# Patient Record
Sex: MALE | Race: WHITE | NOT HISPANIC OR LATINO | Employment: FULL TIME | URBAN - METROPOLITAN AREA
[De-identification: names, ages, dates, MRNs, and addresses within clinical notes are randomized per-mention and may not be internally consistent; named-entity substitution may affect disease eponyms.]

---

## 2017-01-09 ENCOUNTER — OFFICE VISIT (OUTPATIENT)
Dept: FAMILY MEDICINE | Facility: CLINIC | Age: 52
End: 2017-01-09
Payer: COMMERCIAL

## 2017-01-09 VITALS
HEIGHT: 66 IN | SYSTOLIC BLOOD PRESSURE: 150 MMHG | DIASTOLIC BLOOD PRESSURE: 104 MMHG | WEIGHT: 189.63 LBS | HEART RATE: 84 BPM | BODY MASS INDEX: 30.47 KG/M2

## 2017-01-09 DIAGNOSIS — F98.8 ADD (ATTENTION DEFICIT DISORDER): ICD-10-CM

## 2017-01-09 DIAGNOSIS — E34.9 TESTOSTERONE DEFICIENCY: Primary | ICD-10-CM

## 2017-01-09 PROCEDURE — 99213 OFFICE O/P EST LOW 20 MIN: CPT | Mod: S$GLB,,, | Performed by: NURSE PRACTITIONER

## 2017-01-09 PROCEDURE — 99999 PR PBB SHADOW E&M-EST. PATIENT-LVL III: CPT | Mod: PBBFAC,,, | Performed by: NURSE PRACTITIONER

## 2017-01-09 PROCEDURE — 1159F MED LIST DOCD IN RCRD: CPT | Mod: S$GLB,,, | Performed by: NURSE PRACTITIONER

## 2017-01-09 RX ORDER — LISDEXAMFETAMINE DIMESYLATE 50 MG/1
50 CAPSULE ORAL DAILY
Qty: 30 CAPSULE | Refills: 0 | Status: SHIPPED | OUTPATIENT
Start: 2017-01-16 | End: 2017-04-21 | Stop reason: SINTOL

## 2017-01-09 RX ORDER — LISDEXAMFETAMINE DIMESYLATE 50 MG/1
50 CAPSULE ORAL EVERY MORNING
Qty: 30 CAPSULE | Refills: 0 | Status: SHIPPED | OUTPATIENT
Start: 2017-02-16 | End: 2017-04-21 | Stop reason: SINTOL

## 2017-01-09 RX ORDER — TESTOSTERONE 40.5 MG/2.5G
40.5 GEL TOPICAL DAILY
Qty: 2.5 G | Refills: 3 | Status: SHIPPED | OUTPATIENT
Start: 2017-01-09 | End: 2017-04-21

## 2017-01-09 RX ORDER — LISDEXAMFETAMINE DIMESYLATE 50 MG/1
50 CAPSULE ORAL EVERY MORNING
Qty: 30 CAPSULE | Refills: 0 | Status: SHIPPED | OUTPATIENT
Start: 2017-03-16 | End: 2017-04-21 | Stop reason: SINTOL

## 2017-01-09 NOTE — PROGRESS NOTES
Subjective:       Patient ID: Lucien Linares is a 51 y.o. male.    Chief Complaint: Follow-up (3 month vyvanse)    HPI Comments: Patient is here today to review treatment for ADD using stimulant medication. The medication is without adverse side effects. However, he is noticing that some of his inattentive symptoms are slowly returning. (ie day dreaming). Wanting to discuss an increased dose.    Review of Systems   Constitutional: Negative for activity change, appetite change, fatigue and fever.   HENT: Negative.    Respiratory: Negative for cough, chest tightness, shortness of breath and wheezing.    Cardiovascular: Negative for chest pain, palpitations and leg swelling.   Gastrointestinal: Negative.    Genitourinary: Negative.    Musculoskeletal: Negative.    Neurological: Negative for dizziness, weakness, light-headedness and numbness.       Objective:      Physical Exam   Constitutional: He is oriented to person, place, and time. He appears well-nourished.   Cardiovascular: Normal rate, regular rhythm, normal heart sounds and intact distal pulses.    Pulmonary/Chest: Effort normal and breath sounds normal. He has no wheezes. He has no rales.   Neurological: He is alert and oriented to person, place, and time.   Skin: Skin is warm and dry. No rash noted.   Vitals reviewed.      Assessment:       1. Testosterone deficiency    2. ADD (attention deficit disorder)        Plan:       Lucien was seen today for follow-up.    Diagnoses and all orders for this visit:    Testosterone deficiency  -     TESTOSTERONE PANEL; Future       testosterone (ANDROGEL) 1.62 % (40.5 mg/2.5 gram) GlPk; Place 40.5 mg onto the skin once daily.    ADD (attention deficit disorder)  vyvanse refilled per PCP    Return in about 3 months (around 4/9/2017).

## 2017-01-09 NOTE — MR AVS SNAPSHOT
[facility]  [address]  [facility] [address]  Phone: [phone]  Fax: [phone]                  Lucien Linares   2017 3:20 PM   Office Visit    Description:  Male : 1965   Provider:  Emeli Dick NP   Department:  [facility]           Reason for Visit     Follow-up           Diagnoses this Visit        Comments    Testosterone deficiency    -  Primary     ADD (attention deficit disorder)                To Do List           Future Appointments        Provider Department Dept Phone    4/3/2017 10:30 AM LAB, [place] [facility] [phone]    4/10/2017 7:45 AM Jonas Walker MD [facility] [phone]      Goals (5 Years of Data)     None      Follow-Up and Disposition     Return in about 3 months (around 2017).       These Medications        Disp Refills Start End    testosterone (ANDROGEL) 1.62 % (40.5 mg/2.5 gram) GlPk 2.5 g 3 2017     Place 40.5 mg onto the skin once daily. - Transdermal    Pharmacy: [facility] [address] [place] [address] AT [address] Ph #: [phone]         [facility]     [facility] -  Assistance  Registered nurses in the [facility] provide clinical advisement, health education, appointment booking, and other advisory services.  Call for this free service at [phone].             Medications           Message regarding Medications     Verify the changes and/or additions to your medication regime listed below are the same as discussed with your clinician today.  If any of these changes or additions are incorrect, please notify your healthcare provider.        START taking these NEW medications        Refills    testosterone (ANDROGEL) 1.62 % (40.5 mg/2.5 gram) GlPk 3    Sig: Place 40.5 mg onto the skin once daily.    Class: Print    Route: Transdermal      STOP taking these  "medications     testosterone (ANDROGEL) 20.25 mg/1.25 gram (1.62 %) GlPm Place 20.25 mg onto the skin once daily.           Verify that the below list of medications is an accurate representation of the medications you are currently taking.  If none reported, the list may be blank. If incorrect, please contact your healthcare provider. Carry this list with you in case of emergency.           Current Medications     loratadine (CLARITIN) 10 mg tablet Take 10 mg by mouth daily as needed for Allergies.    meloxicam (MOBIC) 15 MG tablet TAKE 1 TABLET(15 MG) BY MOUTH EVERY DAY    lisdexamfetamine (VYVANSE) 50 MG capsule Starting on Jan 16, 2017. Take 1 capsule (50 mg total) by mouth once daily.    lisdexamfetamine (VYVANSE) 50 MG capsule Starting on Feb 16, 2017. Take 1 capsule (50 mg total) by mouth every morning.    lisdexamfetamine (VYVANSE) 50 MG capsule Starting on Mar 16, 2017. Take 1 capsule (50 mg total) by mouth every morning.    testosterone (ANDROGEL) 1.62 % (40.5 mg/2.5 gram) GlPk Place 40.5 mg onto the skin once daily.           Clinical Reference Information           Vital Signs - Last Recorded  Most recent update: 1/9/2017  4:33 PM by Saira Edmondson LPN    BP Pulse Ht Wt BMI    (!) 150/104 (BP Location: Left arm, Patient Position: Sitting, BP Method: Manual) 84 5' 6" (1.676 m) 86 kg (189 lb 9.5 oz) 30.6 kg/m2      Blood Pressure          Most Recent Value    BP  (!)  150/104      Allergies as of 1/9/2017     Codeine    Penicillins      Immunizations Administered on Date of Encounter - 1/9/2017     None      Orders Placed During Today's Visit     Future Labs/Procedures Expected by Expires    TESTOSTERONE PANEL  1/9/2017 3/10/2018      "

## 2017-02-12 DIAGNOSIS — M19.012 ARTHRITIS OF LEFT ACROMIOCLAVICULAR JOINT: ICD-10-CM

## 2017-02-12 DIAGNOSIS — M75.22 BICEPS TENDONITIS, LEFT: ICD-10-CM

## 2017-02-12 DIAGNOSIS — M25.512 ACUTE PAIN OF LEFT SHOULDER: ICD-10-CM

## 2017-02-12 RX ORDER — MELOXICAM 15 MG/1
TABLET ORAL
Qty: 60 TABLET | Refills: 0 | Status: SHIPPED | OUTPATIENT
Start: 2017-02-12 | End: 2017-04-12 | Stop reason: SDUPTHER

## 2017-03-14 ENCOUNTER — PATIENT MESSAGE (OUTPATIENT)
Dept: FAMILY MEDICINE | Facility: CLINIC | Age: 52
End: 2017-03-14

## 2017-03-14 DIAGNOSIS — E29.1 MALE HYPOGONADISM: Primary | ICD-10-CM

## 2017-03-31 PROBLEM — E34.9 TESTOSTERONE DEFICIENCY: Status: RESOLVED | Noted: 2017-01-09 | Resolved: 2017-03-31

## 2017-04-06 ENCOUNTER — LAB VISIT (OUTPATIENT)
Dept: LAB | Facility: HOSPITAL | Age: 52
End: 2017-04-06
Attending: INTERNAL MEDICINE
Payer: COMMERCIAL

## 2017-04-06 DIAGNOSIS — E29.1 OTHER TESTICULAR HYPOFUNCTION: Primary | ICD-10-CM

## 2017-04-06 LAB
ALBUMIN SERPL BCP-MCNC: 4.1 G/DL
ALP SERPL-CCNC: 110 U/L
ALT SERPL W/O P-5'-P-CCNC: 33 U/L
AST SERPL-CCNC: 33 U/L
BASOPHILS # BLD AUTO: 0.03 K/UL
BASOPHILS NFR BLD: 0.4 %
BILIRUB DIRECT SERPL-MCNC: 0.2 MG/DL
BILIRUB SERPL-MCNC: 0.6 MG/DL
COMPLEXED PSA SERPL-MCNC: 2.4 NG/ML
DIFFERENTIAL METHOD: NORMAL
EOSINOPHIL # BLD AUTO: 0.2 K/UL
EOSINOPHIL NFR BLD: 2.5 %
ERYTHROCYTE [DISTWIDTH] IN BLOOD BY AUTOMATED COUNT: 12.7 %
HCT VFR BLD AUTO: 45.7 %
HGB BLD-MCNC: 15.7 G/DL
LYMPHOCYTES # BLD AUTO: 2.1 K/UL
LYMPHOCYTES NFR BLD: 29.9 %
MCH RBC QN AUTO: 29.8 PG
MCHC RBC AUTO-ENTMCNC: 34.4 %
MCV RBC AUTO: 87 FL
MONOCYTES # BLD AUTO: 0.5 K/UL
MONOCYTES NFR BLD: 7.6 %
NEUTROPHILS # BLD AUTO: 4.1 K/UL
NEUTROPHILS NFR BLD: 59.3 %
PLATELET # BLD AUTO: 289 K/UL
PMV BLD AUTO: 10.3 FL
PROT SERPL-MCNC: 7.7 G/DL
RBC # BLD AUTO: 5.27 M/UL
TESTOST SERPL-MCNC: 247 NG/DL
WBC # BLD AUTO: 6.93 K/UL

## 2017-04-06 PROCEDURE — 84403 ASSAY OF TOTAL TESTOSTERONE: CPT

## 2017-04-06 PROCEDURE — 36415 COLL VENOUS BLD VENIPUNCTURE: CPT | Mod: PO

## 2017-04-06 PROCEDURE — 80076 HEPATIC FUNCTION PANEL: CPT

## 2017-04-06 PROCEDURE — 84153 ASSAY OF PSA TOTAL: CPT

## 2017-04-06 PROCEDURE — 85025 COMPLETE CBC W/AUTO DIFF WBC: CPT

## 2017-04-12 DIAGNOSIS — M25.512 ACUTE PAIN OF LEFT SHOULDER: ICD-10-CM

## 2017-04-12 DIAGNOSIS — M75.22 BICEPS TENDONITIS, LEFT: ICD-10-CM

## 2017-04-12 DIAGNOSIS — M19.012 ARTHRITIS OF LEFT ACROMIOCLAVICULAR JOINT: ICD-10-CM

## 2017-04-12 RX ORDER — MELOXICAM 15 MG/1
TABLET ORAL
Qty: 60 TABLET | Refills: 0 | Status: SHIPPED | OUTPATIENT
Start: 2017-04-12 | End: 2017-06-15 | Stop reason: SDUPTHER

## 2017-06-15 DIAGNOSIS — M25.512 ACUTE PAIN OF LEFT SHOULDER: ICD-10-CM

## 2017-06-15 DIAGNOSIS — M19.012 ARTHRITIS OF LEFT ACROMIOCLAVICULAR JOINT: ICD-10-CM

## 2017-06-15 DIAGNOSIS — M75.22 BICEPS TENDONITIS, LEFT: ICD-10-CM

## 2017-06-15 RX ORDER — MELOXICAM 15 MG/1
TABLET ORAL
Qty: 60 TABLET | Refills: 0 | Status: SHIPPED | OUTPATIENT
Start: 2017-06-15 | End: 2017-07-13 | Stop reason: SDUPTHER

## 2017-08-19 DIAGNOSIS — M25.512 ACUTE PAIN OF LEFT SHOULDER: ICD-10-CM

## 2017-08-19 DIAGNOSIS — M19.012 ARTHRITIS OF LEFT ACROMIOCLAVICULAR JOINT: ICD-10-CM

## 2017-08-19 DIAGNOSIS — M75.22 BICEPS TENDONITIS, LEFT: ICD-10-CM

## 2017-08-19 RX ORDER — MELOXICAM 15 MG/1
TABLET ORAL
Qty: 60 TABLET | Refills: 0 | Status: SHIPPED | OUTPATIENT
Start: 2017-08-19 | End: 2018-10-24 | Stop reason: SDUPTHER

## 2018-08-31 ENCOUNTER — TELEPHONE (OUTPATIENT)
Dept: SURGERY | Facility: CLINIC | Age: 53
End: 2018-08-31

## 2018-08-31 NOTE — TELEPHONE ENCOUNTER
----- Message from Krystyna Nguyen sent at 8/31/2018 10:52 AM CDT -----  Contact: Self  Patient is calling to see if he can get a sooner appointment than 9/6 with a surgeon to schedule gallbladder removal.  He is in a good deal of discomfort and some pain.  Was in the ED at [facility] on 8/30.  Please call patient at [phone].

## 2018-08-31 NOTE — TELEPHONE ENCOUNTER
I called patient to offer him an appointment on Tuesday, 9/4/18 at 9:30am in [place] with Dr. Cadena.  Sb

## 2018-09-04 ENCOUNTER — OFFICE VISIT (OUTPATIENT)
Dept: SURGERY | Facility: CLINIC | Age: 53
End: 2018-09-04
Payer: COMMERCIAL

## 2018-09-04 DIAGNOSIS — K80.50 BILIARY COLIC: Primary | ICD-10-CM

## 2018-09-04 PROCEDURE — 99244 OFF/OP CNSLTJ NEW/EST MOD 40: CPT | Mod: S$GLB,,, | Performed by: SURGERY

## 2018-09-04 PROCEDURE — 99999 PR PBB SHADOW E&M-EST. PATIENT-LVL III: CPT | Mod: PBBFAC,,, | Performed by: SURGERY

## 2018-09-04 NOTE — PATIENT INSTRUCTIONS
Surgery is scheduled for 09/12/18 arrival time per the preop nurse.  Preop will call from [phone]  Fasting after midnight  Someone to drive you home      THE PREOP NURSE WILL CALL, SOMETIMES AS LATE AS 4 PM IN THE AFTERNOON THE DAY BEFORE SURGERY.    Bathe the night before and the morning of your procedure with a Chlorhexidine wash such as Hibiclens, can be purchased at most Pharmacy's.    Special Instruction: no

## 2018-09-04 NOTE — LETTER
September 4, 2018      Devendra Vieyra MD  [address]  [place] [address]           [facility]  [address]  [facility] [address]  Phone: [phone]          Patient: Lucien Linares   MR Number: [id number]   YOB: 1965   Date of Visit: 9/4/2018       Dear Dr. Devendra Vieyra:    Thank you for referring Lucien Linares to me for evaluation. Attached you will find relevant portions of my assessment and plan of care.    If you have questions, please do not hesitate to call me. I look forward to following Lucien iLnares along with you.    Sincerely,    Yuval Cadena MD    Enclosure  CC:  No Recipients    If you would like to receive this communication electronically, please contact [email or web address] or [phone] to request more information on [employer] Link access.    For providers and/or their staff who would like to refer a patient to [facility], please contact us through our one-stop-shop provider referral line, [facility], at [phone].    If you feel you have received this communication in error or would no longer like to receive these types of communications, please e-mail [email or web address]

## 2018-09-04 NOTE — PROGRESS NOTES
Subjective:       Patient ID: Lucien Linares is a 52 y.o. male.    Chief Complaint: Consult (Gallstones)    HPI  Pleasant 51 yo M referred to me in consultation from Dr Vieyra.  Pt presented to urgent care last week with pain in the epigastric region that radiated around his r flank.  Pt notes that pain came on suddenly and lasted for several hours prompting his visit to urgent care.  He denies n/v.  No fever/chills.  He notes that he had one similar attack of pain years ago.  He has felt fie since attack last week.  US in urgent care demonstrated cholelithiasis. Pt is otherwise healthy.  Pt with no other significant PShx.   Review of Systems   Constitutional: Negative for activity change, appetite change, diaphoresis, fever and unexpected weight change.   HENT: Negative for congestion and hearing loss.    Respiratory: Negative for cough, chest tightness and wheezing.    Cardiovascular: Negative for chest pain and palpitations.   Gastrointestinal: Positive for abdominal pain. Negative for abdominal distention, anal bleeding, blood in stool, constipation, diarrhea, nausea, rectal pain and vomiting.   Genitourinary: Negative for difficulty urinating, dysuria and hematuria.   Musculoskeletal: Negative for back pain and gait problem.   Skin: Negative for color change and wound.   Neurological: Negative for tremors and seizures.   Hematological: Negative for adenopathy. Does not bruise/bleed easily.   Psychiatric/Behavioral: Negative for agitation and decreased concentration.       Objective:      Physical Exam   Constitutional: He is oriented to person, place, and time. He appears well-developed and well-nourished.   HENT:   Head: Normocephalic and atraumatic.   Eyes: Pupils are equal, round, and reactive to light. Right eye exhibits no discharge. Left eye exhibits no discharge. No scleral icterus.   Neck: Normal range of motion. No tracheal deviation present. No thyromegaly present.   Cardiovascular: Normal rate,  regular rhythm and normal heart sounds. Exam reveals no gallop and no friction rub.   No murmur heard.  Pulmonary/Chest: Effort normal and breath sounds normal. He has no wheezes. He exhibits no tenderness.   Abdominal: Soft. He exhibits no distension and no mass. There is no tenderness. There is no rebound and no guarding. No hernia.   Musculoskeletal: Normal range of motion. He exhibits no edema or deformity.   Lymphadenopathy:     He has no cervical adenopathy.        Right: No supraclavicular adenopathy present.        Left: No supraclavicular adenopathy present.   Neurological: He is alert and oriented to person, place, and time. Coordination normal.   Skin: Skin is warm. No rash noted. No erythema. No pallor.   Psychiatric: He has a normal mood and affect.   Vitals reviewed.        Lab Results   Component Value Date    WBC 6.94 04/28/2017    HGB 15.1 04/28/2017    HCT 45.9 04/28/2017    MCV 88 04/28/2017     04/28/2017     CBC and CMP at urgent care reviewed and WNL    US; cholelithiasis    Assessment:     Biliary colic  No diagnosis found.    Plan:       D/w pt. I have recommended and offered lap faustina as I suspect he has been suffering with biliary colic.  Procedure scheduled for next Wednesday.

## 2018-09-04 NOTE — Clinical Note
I saw your patient, Lucien Linares in the office.  Attached are my findings and plan.  Thank you for referring him to my office and if you have any questions please do not hesitate to call my cell [phone].Wyatt Cadena

## 2018-09-04 NOTE — H&P (VIEW-ONLY)
Subjective:       Patient ID: Lucien Linares is a 52 y.o. male.    Chief Complaint: Consult (Gallstones)    HPI  Pleasant 53 yo M referred to me in consultation from Dr Vieyra.  Pt presented to urgent care last week with pain in the epigastric region that radiated around his r flank.  Pt notes that pain came on suddenly and lasted for several hours prompting his visit to urgent care.  He denies n/v.  No fever/chills.  He notes that he had one similar attack of pain years ago.  He has felt fie since attack last week.  US in urgent care demonstrated cholelithiasis. Pt is otherwise healthy.  Pt with no other significant PShx.   Review of Systems   Constitutional: Negative for activity change, appetite change, diaphoresis, fever and unexpected weight change.   HENT: Negative for congestion and hearing loss.    Respiratory: Negative for cough, chest tightness and wheezing.    Cardiovascular: Negative for chest pain and palpitations.   Gastrointestinal: Positive for abdominal pain. Negative for abdominal distention, anal bleeding, blood in stool, constipation, diarrhea, nausea, rectal pain and vomiting.   Genitourinary: Negative for difficulty urinating, dysuria and hematuria.   Musculoskeletal: Negative for back pain and gait problem.   Skin: Negative for color change and wound.   Neurological: Negative for tremors and seizures.   Hematological: Negative for adenopathy. Does not bruise/bleed easily.   Psychiatric/Behavioral: Negative for agitation and decreased concentration.       Objective:      Physical Exam   Constitutional: He is oriented to person, place, and time. He appears well-developed and well-nourished.   HENT:   Head: Normocephalic and atraumatic.   Eyes: Pupils are equal, round, and reactive to light. Right eye exhibits no discharge. Left eye exhibits no discharge. No scleral icterus.   Neck: Normal range of motion. No tracheal deviation present. No thyromegaly present.   Cardiovascular: Normal rate,  regular rhythm and normal heart sounds. Exam reveals no gallop and no friction rub.   No murmur heard.  Pulmonary/Chest: Effort normal and breath sounds normal. He has no wheezes. He exhibits no tenderness.   Abdominal: Soft. He exhibits no distension and no mass. There is no tenderness. There is no rebound and no guarding. No hernia.   Musculoskeletal: Normal range of motion. He exhibits no edema or deformity.   Lymphadenopathy:     He has no cervical adenopathy.        Right: No supraclavicular adenopathy present.        Left: No supraclavicular adenopathy present.   Neurological: He is alert and oriented to person, place, and time. Coordination normal.   Skin: Skin is warm. No rash noted. No erythema. No pallor.   Psychiatric: He has a normal mood and affect.   Vitals reviewed.        Lab Results   Component Value Date    WBC 6.94 04/28/2017    HGB 15.1 04/28/2017    HCT 45.9 04/28/2017    MCV 88 04/28/2017     04/28/2017     CBC and CMP at urgent care reviewed and WNL    US; cholelithiasis    Assessment:     Biliary colic  No diagnosis found.    Plan:       D/w pt. I have recommended and offered lap faustina as I suspect he has been suffering with biliary colic.  Procedure scheduled for next Wednesday.

## 2018-09-05 RX ORDER — SODIUM CHLORIDE 9 MG/ML
INJECTION, SOLUTION INTRAVENOUS CONTINUOUS
Status: CANCELLED | OUTPATIENT
Start: 2018-09-05

## 2018-09-05 RX ORDER — LIDOCAINE HYDROCHLORIDE 10 MG/ML
1 INJECTION, SOLUTION EPIDURAL; INFILTRATION; INTRACAUDAL; PERINEURAL ONCE
Status: CANCELLED | OUTPATIENT
Start: 2018-09-05 | End: 2018-09-05

## 2018-09-05 RX ORDER — METRONIDAZOLE 500 MG/100ML
500 INJECTION, SOLUTION INTRAVENOUS
Status: CANCELLED | OUTPATIENT
Start: 2018-09-05

## 2018-09-06 ENCOUNTER — PATIENT MESSAGE (OUTPATIENT)
Dept: ADMINISTRATIVE | Facility: OTHER | Age: 53
End: 2018-09-06

## 2018-09-11 ENCOUNTER — ANESTHESIA EVENT (OUTPATIENT)
Dept: SURGERY | Facility: HOSPITAL | Age: 53
End: 2018-09-11
Payer: COMMERCIAL

## 2018-09-12 ENCOUNTER — ANESTHESIA (OUTPATIENT)
Dept: SURGERY | Facility: HOSPITAL | Age: 53
End: 2018-09-12
Payer: COMMERCIAL

## 2018-09-12 ENCOUNTER — HOSPITAL ENCOUNTER (OUTPATIENT)
Facility: HOSPITAL | Age: 53
Discharge: HOME | End: 2018-09-12
Attending: SURGERY | Admitting: SURGERY
Payer: COMMERCIAL

## 2018-09-12 VITALS
SYSTOLIC BLOOD PRESSURE: 134 MMHG | DIASTOLIC BLOOD PRESSURE: 79 MMHG | HEIGHT: 66 IN | WEIGHT: 190 LBS | BODY MASS INDEX: 30.53 KG/M2 | TEMPERATURE: 98 F | OXYGEN SATURATION: 93 % | RESPIRATION RATE: 14 BRPM | HEART RATE: 102 BPM

## 2018-09-12 DIAGNOSIS — K80.50 BILIARY COLIC: ICD-10-CM

## 2018-09-12 PROCEDURE — 27201423 OPTIME MED/SURG SUP & DEVICES STERILE SUPPLY: Mod: PO | Performed by: SURGERY

## 2018-09-12 PROCEDURE — 25000003 PHARM REV CODE 250: Mod: PO | Performed by: NURSE ANESTHETIST, CERTIFIED REGISTERED

## 2018-09-12 PROCEDURE — D9220A PRA ANESTHESIA: Mod: CRNA,,, | Performed by: NURSE ANESTHETIST, CERTIFIED REGISTERED

## 2018-09-12 PROCEDURE — 47562 LAPAROSCOPIC CHOLECYSTECTOMY: CPT | Mod: ,,, | Performed by: SURGERY

## 2018-09-12 PROCEDURE — 71000033 HC RECOVERY, INTIAL HOUR: Mod: PO | Performed by: SURGERY

## 2018-09-12 PROCEDURE — 71000039 HC RECOVERY, EACH ADD'L HOUR: Mod: PO | Performed by: SURGERY

## 2018-09-12 PROCEDURE — 63600175 PHARM REV CODE 636 W HCPCS: Mod: PO | Performed by: NURSE ANESTHETIST, CERTIFIED REGISTERED

## 2018-09-12 PROCEDURE — 88304 TISSUE EXAM BY PATHOLOGIST: CPT | Performed by: PATHOLOGY

## 2018-09-12 PROCEDURE — 88304 TISSUE EXAM BY PATHOLOGIST: CPT | Mod: 26,,, | Performed by: PATHOLOGY

## 2018-09-12 PROCEDURE — 25000003 PHARM REV CODE 250: Mod: PO | Performed by: SURGERY

## 2018-09-12 PROCEDURE — 63600175 PHARM REV CODE 636 W HCPCS: Mod: PO | Performed by: SURGERY

## 2018-09-12 PROCEDURE — S0030 INJECTION, METRONIDAZOLE: HCPCS | Mod: PO | Performed by: SURGERY

## 2018-09-12 PROCEDURE — 25000003 PHARM REV CODE 250: Mod: PO | Performed by: ANESTHESIOLOGY

## 2018-09-12 PROCEDURE — 36000709 HC OR TIME LEV III EA ADD 15 MIN: Mod: PO | Performed by: SURGERY

## 2018-09-12 PROCEDURE — 37000008 HC ANESTHESIA 1ST 15 MINUTES: Mod: PO | Performed by: SURGERY

## 2018-09-12 PROCEDURE — 36000708 HC OR TIME LEV III 1ST 15 MIN: Mod: PO | Performed by: SURGERY

## 2018-09-12 PROCEDURE — D9220A PRA ANESTHESIA: Mod: ANES,,, | Performed by: ANESTHESIOLOGY

## 2018-09-12 PROCEDURE — 37000009 HC ANESTHESIA EA ADD 15 MINS: Mod: PO | Performed by: SURGERY

## 2018-09-12 PROCEDURE — 63600175 PHARM REV CODE 636 W HCPCS: Mod: PO | Performed by: ANESTHESIOLOGY

## 2018-09-12 RX ORDER — LIDOCAINE HYDROCHLORIDE 10 MG/ML
1 INJECTION, SOLUTION EPIDURAL; INFILTRATION; INTRACAUDAL; PERINEURAL ONCE
Status: DISCONTINUED | OUTPATIENT
Start: 2018-09-12 | End: 2018-09-12 | Stop reason: HOSPADM

## 2018-09-12 RX ORDER — HYDROCODONE BITARTRATE AND ACETAMINOPHEN 5; 325 MG/1; MG/1
1 TABLET ORAL EVERY 6 HOURS PRN
Qty: 30 TABLET | Refills: 0 | Status: SHIPPED | OUTPATIENT
Start: 2018-09-12 | End: 2018-10-23

## 2018-09-12 RX ORDER — PROPOFOL 10 MG/ML
VIAL (ML) INTRAVENOUS
Status: DISCONTINUED | OUTPATIENT
Start: 2018-09-12 | End: 2018-09-12

## 2018-09-12 RX ORDER — ONDANSETRON 2 MG/ML
4 INJECTION INTRAMUSCULAR; INTRAVENOUS EVERY 12 HOURS PRN
Status: DISCONTINUED | OUTPATIENT
Start: 2018-09-12 | End: 2018-09-12 | Stop reason: HOSPADM

## 2018-09-12 RX ORDER — OXYCODONE HYDROCHLORIDE 5 MG/1
5 TABLET ORAL ONCE AS NEEDED
Status: COMPLETED | OUTPATIENT
Start: 2018-09-12 | End: 2018-09-12

## 2018-09-12 RX ORDER — FENTANYL CITRATE 50 UG/ML
25 INJECTION, SOLUTION INTRAMUSCULAR; INTRAVENOUS EVERY 5 MIN PRN
Status: DISCONTINUED | OUTPATIENT
Start: 2018-09-12 | End: 2018-09-12 | Stop reason: HOSPADM

## 2018-09-12 RX ORDER — EPHEDRINE SULFATE 50 MG/ML
INJECTION, SOLUTION INTRAVENOUS
Status: DISCONTINUED | OUTPATIENT
Start: 2018-09-12 | End: 2018-09-12

## 2018-09-12 RX ORDER — METRONIDAZOLE 500 MG/100ML
500 INJECTION, SOLUTION INTRAVENOUS
Status: COMPLETED | OUTPATIENT
Start: 2018-09-12 | End: 2018-09-12

## 2018-09-12 RX ORDER — NEOSTIGMINE METHYLSULFATE 1 MG/ML
INJECTION, SOLUTION INTRAVENOUS
Status: DISCONTINUED | OUTPATIENT
Start: 2018-09-12 | End: 2018-09-12

## 2018-09-12 RX ORDER — ONDANSETRON 2 MG/ML
INJECTION INTRAMUSCULAR; INTRAVENOUS
Status: DISCONTINUED | OUTPATIENT
Start: 2018-09-12 | End: 2018-09-12

## 2018-09-12 RX ORDER — HYDROCODONE BITARTRATE AND ACETAMINOPHEN 5; 325 MG/1; MG/1
1 TABLET ORAL EVERY 4 HOURS PRN
Status: DISCONTINUED | OUTPATIENT
Start: 2018-09-12 | End: 2018-09-12 | Stop reason: HOSPADM

## 2018-09-12 RX ORDER — DEXAMETHASONE SODIUM PHOSPHATE 4 MG/ML
8 INJECTION, SOLUTION INTRA-ARTICULAR; INTRALESIONAL; INTRAMUSCULAR; INTRAVENOUS; SOFT TISSUE
Status: COMPLETED | OUTPATIENT
Start: 2018-09-12 | End: 2018-09-12

## 2018-09-12 RX ORDER — MIDAZOLAM HYDROCHLORIDE 1 MG/ML
INJECTION, SOLUTION INTRAMUSCULAR; INTRAVENOUS
Status: DISCONTINUED | OUTPATIENT
Start: 2018-09-12 | End: 2018-09-12

## 2018-09-12 RX ORDER — SODIUM CHLORIDE 9 MG/ML
INJECTION, SOLUTION INTRAVENOUS CONTINUOUS
Status: DISCONTINUED | OUTPATIENT
Start: 2018-09-12 | End: 2018-09-12 | Stop reason: HOSPADM

## 2018-09-12 RX ORDER — SODIUM CHLORIDE, SODIUM LACTATE, POTASSIUM CHLORIDE, CALCIUM CHLORIDE 600; 310; 30; 20 MG/100ML; MG/100ML; MG/100ML; MG/100ML
INJECTION, SOLUTION INTRAVENOUS CONTINUOUS
Status: DISCONTINUED | OUTPATIENT
Start: 2018-09-12 | End: 2018-09-12 | Stop reason: HOSPADM

## 2018-09-12 RX ORDER — CIPROFLOXACIN 2 MG/ML
400 INJECTION, SOLUTION INTRAVENOUS
Status: COMPLETED | OUTPATIENT
Start: 2018-09-12 | End: 2018-09-12

## 2018-09-12 RX ORDER — GLYCOPYRROLATE 0.2 MG/ML
INJECTION INTRAMUSCULAR; INTRAVENOUS
Status: DISCONTINUED | OUTPATIENT
Start: 2018-09-12 | End: 2018-09-12

## 2018-09-12 RX ORDER — HYDROMORPHONE HYDROCHLORIDE 2 MG/ML
1 INJECTION, SOLUTION INTRAMUSCULAR; INTRAVENOUS; SUBCUTANEOUS EVERY 4 HOURS PRN
Status: DISCONTINUED | OUTPATIENT
Start: 2018-09-12 | End: 2018-09-12 | Stop reason: HOSPADM

## 2018-09-12 RX ORDER — SODIUM CHLORIDE 0.9 % (FLUSH) 0.9 %
3 SYRINGE (ML) INJECTION
Status: DISCONTINUED | OUTPATIENT
Start: 2018-09-12 | End: 2018-09-12 | Stop reason: HOSPADM

## 2018-09-12 RX ORDER — LIDOCAINE HCL/PF 100 MG/5ML
SYRINGE (ML) INTRAVENOUS
Status: DISCONTINUED | OUTPATIENT
Start: 2018-09-12 | End: 2018-09-12

## 2018-09-12 RX ORDER — ROCURONIUM BROMIDE 10 MG/ML
INJECTION, SOLUTION INTRAVENOUS
Status: DISCONTINUED | OUTPATIENT
Start: 2018-09-12 | End: 2018-09-12

## 2018-09-12 RX ORDER — BUPIVACAINE HYDROCHLORIDE AND EPINEPHRINE 2.5; 5 MG/ML; UG/ML
INJECTION, SOLUTION EPIDURAL; INFILTRATION; INTRACAUDAL; PERINEURAL
Status: DISCONTINUED | OUTPATIENT
Start: 2018-09-12 | End: 2018-09-12 | Stop reason: HOSPADM

## 2018-09-12 RX ORDER — ACETAMINOPHEN 10 MG/ML
INJECTION, SOLUTION INTRAVENOUS
Status: DISCONTINUED | OUTPATIENT
Start: 2018-09-12 | End: 2018-09-12

## 2018-09-12 RX ORDER — FENTANYL CITRATE 50 UG/ML
INJECTION, SOLUTION INTRAMUSCULAR; INTRAVENOUS
Status: DISCONTINUED | OUTPATIENT
Start: 2018-09-12 | End: 2018-09-12

## 2018-09-12 RX ADMIN — PROPOFOL 180 MG: 10 INJECTION, EMULSION INTRAVENOUS at 07:09

## 2018-09-12 RX ADMIN — FENTANYL CITRATE 50 MCG: 50 INJECTION, SOLUTION INTRAMUSCULAR; INTRAVENOUS at 08:09

## 2018-09-12 RX ADMIN — PROPOFOL 20 MG: 10 INJECTION, EMULSION INTRAVENOUS at 07:09

## 2018-09-12 RX ADMIN — ACETAMINOPHEN 1000 MG: 10 INJECTION, SOLUTION INTRAVENOUS at 07:09

## 2018-09-12 RX ADMIN — METRONIDAZOLE 500 MG: 500 INJECTION, SOLUTION INTRAVENOUS at 06:09

## 2018-09-12 RX ADMIN — MIDAZOLAM HYDROCHLORIDE 2 MG: 1 INJECTION, SOLUTION INTRAMUSCULAR; INTRAVENOUS at 06:09

## 2018-09-12 RX ADMIN — FENTANYL CITRATE 100 MCG: 50 INJECTION, SOLUTION INTRAMUSCULAR; INTRAVENOUS at 07:09

## 2018-09-12 RX ADMIN — ROCURONIUM BROMIDE 10 MG: 10 INJECTION, SOLUTION INTRAVENOUS at 07:09

## 2018-09-12 RX ADMIN — SODIUM CHLORIDE, SODIUM LACTATE, POTASSIUM CHLORIDE, AND CALCIUM CHLORIDE: .6; .31; .03; .02 INJECTION, SOLUTION INTRAVENOUS at 06:09

## 2018-09-12 RX ADMIN — DEXAMETHASONE SODIUM PHOSPHATE 8 MG: 4 INJECTION, SOLUTION INTRAMUSCULAR; INTRAVENOUS at 06:09

## 2018-09-12 RX ADMIN — GLYCOPYRROLATE 0.6 MG: 0.2 INJECTION, SOLUTION INTRAMUSCULAR; INTRAVENOUS at 07:09

## 2018-09-12 RX ADMIN — CIPROFLOXACIN 400 MG: 2 INJECTION, SOLUTION INTRAVENOUS at 07:09

## 2018-09-12 RX ADMIN — EPHEDRINE SULFATE 10 MG: 50 INJECTION, SOLUTION INTRAMUSCULAR; INTRAVENOUS; SUBCUTANEOUS at 07:09

## 2018-09-12 RX ADMIN — OXYCODONE HYDROCHLORIDE 5 MG: 5 TABLET ORAL at 08:09

## 2018-09-12 RX ADMIN — ONDANSETRON 4 MG: 2 INJECTION, SOLUTION INTRAMUSCULAR; INTRAVENOUS at 07:09

## 2018-09-12 RX ADMIN — LIDOCAINE HYDROCHLORIDE 75 MG: 20 INJECTION PARENTERAL at 07:09

## 2018-09-12 RX ADMIN — NEOSTIGMINE METHYLSULFATE 4 MG: 1 INJECTION INTRAVENOUS at 07:09

## 2018-09-12 RX ADMIN — ROCURONIUM BROMIDE 30 MG: 10 INJECTION, SOLUTION INTRAVENOUS at 07:09

## 2018-09-12 NOTE — TRANSFER OF CARE
"Anesthesia Transfer of Care Note    Patient: Lucien Linares    Procedure(s) Performed: Procedure(s) (LRB):  CHOLECYSTECTOMY, LAPAROSCOPIC (N/A)    Patient location: PACU    Anesthesia Type: general    Transport from OR: Transported from OR on room air with adequate spontaneous ventilation    Post pain: adequate analgesia    Post assessment: no apparent anesthetic complications    Post vital signs: stable    Level of consciousness: awake    Nausea/Vomiting: no nausea/vomiting    Complications: none    Transfer of care protocol was followed      Last vitals:   Visit Vitals  BP (!) 152/89 (BP Location: Left arm, Patient Position: Lying)   Pulse 95   Temp 36.5 °C (97.7 °F) (Skin)   Resp 15   Ht 5' 6" (1.676 m)   Wt 86.2 kg (190 lb)   SpO2 95%   BMI 30.67 kg/m²     "

## 2018-09-12 NOTE — DISCHARGE INSTRUCTIONS
Department of General Surgery  [facility]  LAPAROSCOPIC CHOLECYSTECTOMY    DOS:   Minimal activity for 48 hours   Regular diet as tolerated   May shower in 48 hours   May remove outer dressing in 48 hours. Leave ster-strips in place. If steri-strips get wet, pat dry. If they fall off, do not worry.   Resume home medications as prescribed.    DONT:   Do not lift anything over 15 pounds until you have been released by your physician.   Do not soak in tub   No driving for 24 hours or while taking narcotic pain medication.   DO NOT TAKE ADDITIONAL TYLENOL/ACETAMINOPHEN WHILE TAKING NARCOTIC PAIN MEDICINE THAT CONTAINS TYLENOL/ACETAMINOPHEN.    CALL PHYSICIAN FOR:   Redness, swelling, or bleeding from surgical site   Fever greater then 101.   Drainage from the incision site that appears infected.   Persistent pain not relieved by pain medication.    RETURN APPOINTMENT: Call to schedule appointment in 10-14 days    FOR EMERGENCIES: Contact  Your doctor at [phone]      Discharge Instructions: After Your Surgery  Youve just had surgery. During surgery, you were given medicine called anesthesia to keep you relaxed and free of pain. After surgery, you may have some pain or nausea. This is common. Here are some tips for feeling better and getting well after surgery.     Stay on schedule with your medicine.   Going home  Your healthcare provider will show you how to take care of yourself when you go home. He or she will also answer your questions. Have an adult family member or friend drive you home. For the first 24 hours after your surgery:  · Do not drive or use heavy equipment.  · Do not make important decisions or sign legal papers.  · Do not drink alcohol.  · Have someone stay with you, if needed. He or she can watch for problems and help keep you safe.  Be sure to go to all follow-up visits with your healthcare provider. And rest after your surgery for as long as your healthcare provider  tells you to.  Coping with pain  If you have pain after surgery, pain medicine will help you feel better. Take it as told, before pain becomes severe. Also, ask your healthcare provider or pharmacist about other ways to control pain. This might be with heat, ice, or relaxation. And follow any other instructions your surgeon or nurse gives you.  Tips for taking pain medicine  To get the best relief possible, remember these points:  · Pain medicines can upset your stomach. Taking them with a little food may help.  · Most pain relievers taken by mouth need at least 20 to 30 minutes to start to work.  · Taking medicine on a schedule can help you remember to take it. Try to time your medicine so that you can take it before starting an activity. This might be before you get dressed, go for a walk, or sit down for dinner.  · Constipation is a common side effect of pain medicines. Call your healthcare provider before taking any medicines such as laxatives or stool softeners to help ease constipation. Also ask if you should skip any foods. Drinking lots of fluids and eating foods such as fruits and vegetables that are high in fiber can also help. Remember, do not take laxatives unless your surgeon has prescribed them.  · Drinking alcohol and taking pain medicine can cause dizziness and slow your breathing. It can even be deadly. Do not drink alcohol while taking pain medicine.  · Pain medicine can make you react more slowly to things. Do not drive or run machinery while taking pain medicine.  Your healthcare provider may tell you to take acetaminophen to help ease your pain. Ask him or her how much you are supposed to take each day. Acetaminophen or other pain relievers may interact with your prescription medicines or other over-the-counter (OTC) medicines. Some prescription medicines have acetaminophen and other ingredients. Using both prescription and OTC acetaminophen for pain can cause you to overdose. Read the labels on  your OTC medicines with care. This will help you to clearly know the list of ingredients, how much to take, and any warnings. It may also help you not take too much acetaminophen. If you have questions or do not understand the information, ask your pharmacist or healthcare provider to explain it to you before you take the OTC medicine.  Managing nausea  Some people have an upset stomach after surgery. This is often because of anesthesia, pain, or pain medicine, or the stress of surgery. These tips will help you handle nausea and eat healthy foods as you get better. If you were on a special food plan before surgery, ask your healthcare provider if you should follow it while you get better. These tips may help:  · Do not push yourself to eat. Your body will tell you when to eat and how much.  · Start off with clear liquids and soup. They are easier to digest.  · Next try semi-solid foods, such as mashed potatoes, applesauce, and gelatin, as you feel ready.  · Slowly move to solid foods. Dont eat fatty, rich, or spicy foods at first.  · Do not force yourself to have 3 large meals a day. Instead eat smaller amounts more often.  · Take pain medicines with a small amount of solid food, such as crackers or toast, to avoid nausea.     Call your surgeon if  · You still have pain an hour after taking medicine. The medicine may not be strong enough.  · You feel too sleepy, dizzy, or groggy. The medicine may be too strong.  · You have side effects like nausea, vomiting, or skin changes, such as rash, itching, or hives.       If you have obstructive sleep apnea  You were given anesthesia medicine during surgery to keep you comfortable and free of pain. After surgery, you may have more apnea spells because of this medicine and other medicines you were given. The spells may last longer than usual.   At home:  · Keep using the continuous positive airway pressure (CPAP) device when you sleep. Unless your healthcare provider tells  you not to, use it when you sleep, day or night. CPAP is a common device used to treat obstructive sleep apnea.  · Talk with your provider before taking any pain medicine, muscle relaxants, or sedatives. Your provider will tell you about the possible dangers of taking these medicines.  Date Last Reviewed: 12/1/2016  © 0178-2429 [employer]. [address] [place] [address]. All rights reserved. This information is not intended as a substitute for professional medical care. Always follow your healthcare professional's instructions.

## 2018-09-12 NOTE — ANESTHESIA PREPROCEDURE EVALUATION
09/12/2018  Lucien Linares is a 52 y.o., male.    Anesthesia Evaluation      I have reviewed the Medications.     Review of Systems  Anesthesia Hx:  No problems with previous Anesthesia   Social:  Non-Smoker, No Alcohol Use    Cardiovascular:   Hypertension    Pulmonary:  Pulmonary Normal    Renal/:  Renal/ Normal     Hepatic/GI:   GERD    Neurological:  Neurology Normal    Endocrine:  Endocrine Normal        Physical Exam  General:  Obesity    Airway/Jaw/Neck:  Airway Findings: Mouth Opening: Normal Tongue: Normal  General Airway Assessment: Adult, Average  Mallampati: II  Jaw/Neck Findings:  Neck ROM: Normal ROM       Chest/Lungs:  Chest/Lungs Findings: Clear to auscultation, Normal Respiratory Rate     Heart/Vascular:  Heart Findings: Rate: Normal  Rhythm: Regular Rhythm  Sounds: Normal  Heart murmur: negative       Mental Status:  Mental Status Findings:  Cooperative, Alert and Oriented         Anesthesia Plan  Type of Anesthesia, risks & benefits discussed:  Anesthesia Type:  general  Patient's Preference:   Intra-op Monitoring Plan:   Intra-op Monitoring Plan Comments:   Post Op Pain Control Plan:   Post Op Pain Control Plan Comments:   Induction:   IV  Beta Blocker:  Patient is not currently on a Beta-Blocker (No further documentation required).       Informed Consent: Patient understands risks and agrees with Anesthesia plan.  Questions answered. Anesthesia consent signed with patient.  ASA Score: 2     Day of Surgery Review of History & Physical:            Ready For Surgery From Anesthesia Perspective.

## 2018-09-12 NOTE — ANESTHESIA POSTPROCEDURE EVALUATION
"Anesthesia Post Evaluation    Patient: Lucien Linares    Procedure(s) Performed: Procedure(s) (LRB):  CHOLECYSTECTOMY, LAPAROSCOPIC (N/A)    Final Anesthesia Type: general  Patient location during evaluation: PACU  Patient participation: Yes- Able to Participate  Level of consciousness: awake and alert and oriented  Post-procedure vital signs: reviewed and stable  Pain management: adequate  Airway patency: patent  PONV status at discharge: No PONV  Anesthetic complications: no      Cardiovascular status: hemodynamically stable and blood pressure returned to baseline  Respiratory status: unassisted, spontaneous ventilation and room air  Hydration status: euvolemic  Follow-up not needed.        Visit Vitals  /79 (BP Location: Left arm)   Pulse 102   Temp 36.5 °C (97.7 °F) (Skin)   Resp 14   Ht 5' 6" (1.676 m)   Wt 86.2 kg (190 lb)   SpO2 (!) 93%   BMI 30.67 kg/m²       Pain/Kimberlyn Score: Pain Assessment Performed: Yes (9/12/2018  9:26 AM)  Presence of Pain: complains of pain/discomfort (9/12/2018  9:26 AM)  Pain Rating Prior to Med Admin: 5 (9/12/2018  8:32 AM)  Kimberlyn Score: 10 (9/12/2018  9:26 AM)        "

## 2018-09-12 NOTE — OP NOTE
DATE OF PROCEDURE:  09/12/2018    STAFF SURGEON:  Yuval Cadena M.D.    PREOPERATIVE DIAGNOSIS:  Biliary colic.    POSTOPERATIVE DIAGNOSIS:  Cholecystitis.    ANESTHESIA:  General endotracheal anesthesia.    INDICATION FOR PROCEDURE:  A pleasant 52-year-old gentleman presented to my   office with epigastric pain and signs and symptoms suggestive of biliary colic,   he had been to the ER previous week and noted to have cholelithiasis on   ultrasound, is scheduled for laparoscopic cholecystectomy on the above-mentioned   date.    DESCRIPTION OF PROCEDURE:  Following signing of informed consent, he received   preoperative IV antibiotics, taken to the OR and placed in supine position.    SCDs were placed.  General anesthesia was administered without event.  Abdomen   was prepped and draped in standard fashion.  Appropriate timeout procedure was   then performed.  After performing timeout procedure, I made a small transverse   incision above the umbilicus, carried down to deep dermal tissue, entered the   abdominal cavity with a Veress needle.  Pneumoperitoneum to 15 mmHg was   established and entered the abdominal cavity with an Optiview trocar under   direct visualization.  I evaluate for injury from the Veress needle.  No such   injury was appreciated.  I then placed the patient in reverse Trendelenburg and   tilted towards his left side.  I placed a 5 mm epigastric trocar as well as two   right subcostal margin trocars under direct visualization.  I then grasped the   fundus of the gallbladder, held it up over the liver.  This exposed the   infundibulum, which I grasped and held towards the patient's feet.  I dissected   the triangle of Calot, identifying the cystic duct and cystic artery in the   usual anatomic locations.  Having done this, I placed 2 clips distally on the   cystic duct, 1 clip proximally, 2 clips were placed on the cystic artery and I   cut between clips and used hook cautery to remove the  gallbladder from the   hepatic fossa.  I then removed the gallbladder from abdominal cavity with the   assistance of an EndoCatch bag.  I then evaluated the hepatic fossa to ensure   adequate hemostasis.  I removed all trocars under direct visualization and   closed the umbilical fascia with 0 Vicryl suture.  Skin incision closed with 4-0   Monocryl.  The patient was extubated, taken to Recovery Room in stable   condition.  There were no immediate complications.  Blood loss was 20 mL.      DENTON  dd: 09/12/2018 08:02:54 (CDT)  td: 09/12/2018 13:20:41 (MELODIE)  Doc ID   #[id number]  Job ID #[id number]    CC:

## 2019-11-26 ENCOUNTER — OFFICE VISIT (OUTPATIENT)
Dept: OPHTHALMOLOGY | Facility: CLINIC | Age: 54
End: 2019-11-26
Payer: COMMERCIAL

## 2019-11-26 DIAGNOSIS — H25.13 AGE-RELATED NUCLEAR CATARACT OF BOTH EYES: Primary | ICD-10-CM

## 2019-11-26 DIAGNOSIS — H52.7 REFRACTIVE ERROR: ICD-10-CM

## 2019-11-26 PROCEDURE — 99999 PR PBB SHADOW E&M-EST. PATIENT-LVL III: CPT | Mod: PBBFAC,,, | Performed by: OPHTHALMOLOGY

## 2019-11-26 PROCEDURE — 92004 PR EYE EXAM, NEW PATIENT,COMPREHESV: ICD-10-PCS | Mod: S$GLB,,, | Performed by: OPHTHALMOLOGY

## 2019-11-26 PROCEDURE — 92015 DETERMINE REFRACTIVE STATE: CPT | Mod: S$GLB,,, | Performed by: OPHTHALMOLOGY

## 2019-11-26 PROCEDURE — 99999 PR PBB SHADOW E&M-EST. PATIENT-LVL III: ICD-10-PCS | Mod: PBBFAC,,, | Performed by: OPHTHALMOLOGY

## 2019-11-26 PROCEDURE — 92004 COMPRE OPH EXAM NEW PT 1/>: CPT | Mod: S$GLB,,, | Performed by: OPHTHALMOLOGY

## 2019-11-26 PROCEDURE — 92015 PR REFRACTION: ICD-10-PCS | Mod: S$GLB,,, | Performed by: OPHTHALMOLOGY

## 2019-11-26 RX ORDER — LOSARTAN POTASSIUM 50 MG/1
TABLET ORAL
Refills: 0 | COMMUNITY
Start: 2019-09-20 | End: 2019-12-18 | Stop reason: SDUPTHER

## 2019-11-26 RX ORDER — HYDROCHLOROTHIAZIDE 12.5 MG/1
CAPSULE ORAL
Refills: 0 | COMMUNITY
Start: 2019-09-20 | End: 2019-12-14 | Stop reason: SDUPTHER

## 2019-11-26 NOTE — PROGRESS NOTES
HPI     Hypertensive Eye Exam      Additional comments: HTN eye exam//  Pt thinks running good, takes meds   every day//  DLE 7/2016//              Comments     HTN eye exam//  Pt thinks running good, takes meds every day//  No blurred va// no flashes or floaters noted//          Last edited by Anaya Rivas on 11/26/2019  9:06 AM. (History)        ROS     Negative for: Constitutional, Gastrointestinal, Neurological, Skin,   Genitourinary, Musculoskeletal, HENT, Endocrine, Cardiovascular, Eyes,   Respiratory, Psychiatric, Allergic/Imm, Heme/Lymph    Last edited by Rafa Valle Jr., MD on 11/26/2019  9:58 AM. (History)        Assessment /Plan     For exam results, see Encounter Report.    Age-related nuclear cataract of both eyes  -no decrease in ADLS, no significant glare, and functionality is satisfactory  -counseled on what to expect if the cataracts worsening and how it can effect the vision  -continue to monitor and if vision changes patient can call for another appointment  Refractive error  Rx for glasses given to patient  Follow up in about 1 year (around 11/26/2020) for Annual.

## 2019-11-26 NOTE — PATIENT INSTRUCTIONS
What Are Cataracts?    A clear lens in the eye focuses light. This lets the eye see images sharply. With age, the lens slowly becomes cloudy. The cloudy lens is a cataract. A cataract scatters light and makes it hard for the eye to focus. Cataracts often form in both eyes. But one lens may cloud faster than the other.  The aging of your lens  Your lens may cloud so slowly that you don`t notice any vision changes at first. But as the cataract gets worse, the eye has a harder time focusing. In early stages, glasses may help you see better. As the lens gets more cloudy, your doctor may recommend surgery to restore your vision.  A clear lens allows your eye to bring objects sharply into focus.  A mild cataract may slightly blur your vision.  A dense cataract can severely blur your vision.  Date Last Reviewed: 6/14/2015  © 8886-8464 [employer]. [address] [place] [address], [place] [address]. All rights reserved. This information is not intended as a substitute for professional medical care. Always follow your healthcare professional's instructions.

## 2021-02-05 ENCOUNTER — OFFICE VISIT (OUTPATIENT)
Dept: URGENT CARE | Facility: CLINIC | Age: 56
End: 2021-02-05
Payer: COMMERCIAL

## 2021-02-05 VITALS
WEIGHT: 202 LBS | BODY MASS INDEX: 32.47 KG/M2 | SYSTOLIC BLOOD PRESSURE: 150 MMHG | DIASTOLIC BLOOD PRESSURE: 96 MMHG | RESPIRATION RATE: 16 BRPM | TEMPERATURE: 98 F | OXYGEN SATURATION: 97 % | HEART RATE: 88 BPM | HEIGHT: 66 IN

## 2021-02-05 DIAGNOSIS — K21.9 GASTROESOPHAGEAL REFLUX DISEASE, UNSPECIFIED WHETHER ESOPHAGITIS PRESENT: Primary | ICD-10-CM

## 2021-02-05 PROCEDURE — 99214 OFFICE O/P EST MOD 30 MIN: CPT | Mod: S$GLB,,, | Performed by: PHYSICIAN ASSISTANT

## 2021-02-05 PROCEDURE — 3008F PR BODY MASS INDEX (BMI) DOCUMENTED: ICD-10-PCS | Mod: CPTII,S$GLB,, | Performed by: PHYSICIAN ASSISTANT

## 2021-02-05 PROCEDURE — 99214 PR OFFICE/OUTPT VISIT, EST, LEVL IV, 30-39 MIN: ICD-10-PCS | Mod: S$GLB,,, | Performed by: PHYSICIAN ASSISTANT

## 2021-02-05 PROCEDURE — 3008F BODY MASS INDEX DOCD: CPT | Mod: CPTII,S$GLB,, | Performed by: PHYSICIAN ASSISTANT

## 2021-02-05 RX ORDER — ESOMEPRAZOLE MAGNESIUM 40 MG/1
40 CAPSULE, DELAYED RELEASE ORAL
Qty: 30 CAPSULE | Refills: 1 | Status: SHIPPED | OUTPATIENT
Start: 2021-02-05 | End: 2021-06-02 | Stop reason: HOSPADM

## 2021-04-29 ENCOUNTER — PATIENT MESSAGE (OUTPATIENT)
Dept: RESEARCH | Facility: HOSPITAL | Age: 56
End: 2021-04-29

## 2021-05-05 ENCOUNTER — OFFICE VISIT (OUTPATIENT)
Dept: OPHTHALMOLOGY | Facility: CLINIC | Age: 56
End: 2021-05-05
Payer: COMMERCIAL

## 2021-05-05 DIAGNOSIS — H52.7 REFRACTIVE ERROR: ICD-10-CM

## 2021-05-05 DIAGNOSIS — H25.13 AGE-RELATED NUCLEAR CATARACT OF BOTH EYES: Primary | ICD-10-CM

## 2021-05-05 PROCEDURE — 99999 PR PBB SHADOW E&M-EST. PATIENT-LVL III: CPT | Mod: PBBFAC,,, | Performed by: OPHTHALMOLOGY

## 2021-05-05 PROCEDURE — 1126F AMNT PAIN NOTED NONE PRSNT: CPT | Mod: S$GLB,,, | Performed by: OPHTHALMOLOGY

## 2021-05-05 PROCEDURE — 99999 PR PBB SHADOW E&M-EST. PATIENT-LVL III: ICD-10-PCS | Mod: PBBFAC,,, | Performed by: OPHTHALMOLOGY

## 2021-05-05 PROCEDURE — 92015 DETERMINE REFRACTIVE STATE: CPT | Mod: S$GLB,,, | Performed by: OPHTHALMOLOGY

## 2021-05-05 PROCEDURE — 99213 OFFICE O/P EST LOW 20 MIN: CPT | Mod: S$GLB,,, | Performed by: OPHTHALMOLOGY

## 2021-05-05 PROCEDURE — 99213 PR OFFICE/OUTPT VISIT, EST, LEVL III, 20-29 MIN: ICD-10-PCS | Mod: S$GLB,,, | Performed by: OPHTHALMOLOGY

## 2021-05-05 PROCEDURE — 1126F PR PAIN SEVERITY QUANTIFIED, NO PAIN PRESENT: ICD-10-PCS | Mod: S$GLB,,, | Performed by: OPHTHALMOLOGY

## 2021-05-05 PROCEDURE — 92015 PR REFRACTION: ICD-10-PCS | Mod: S$GLB,,, | Performed by: OPHTHALMOLOGY

## 2021-05-19 ENCOUNTER — OFFICE VISIT (OUTPATIENT)
Dept: URGENT CARE | Facility: CLINIC | Age: 56
End: 2021-05-19
Payer: COMMERCIAL

## 2021-05-19 VITALS
HEIGHT: 66 IN | SYSTOLIC BLOOD PRESSURE: 112 MMHG | TEMPERATURE: 100 F | BODY MASS INDEX: 32.14 KG/M2 | OXYGEN SATURATION: 95 % | DIASTOLIC BLOOD PRESSURE: 75 MMHG | WEIGHT: 200 LBS | RESPIRATION RATE: 19 BRPM | HEART RATE: 110 BPM

## 2021-05-19 DIAGNOSIS — R09.89 SYMPTOMS OF UPPER RESPIRATORY INFECTION (URI): Primary | ICD-10-CM

## 2021-05-19 LAB
CTP QC/QA: YES
SARS-COV-2 RDRP RESP QL NAA+PROBE: NEGATIVE

## 2021-05-19 PROCEDURE — 3008F BODY MASS INDEX DOCD: CPT | Mod: CPTII,S$GLB,, | Performed by: PHYSICIAN ASSISTANT

## 2021-05-19 PROCEDURE — U0002 COVID-19 LAB TEST NON-CDC: HCPCS | Mod: QW,S$GLB,, | Performed by: PHYSICIAN ASSISTANT

## 2021-05-19 PROCEDURE — 96372 PR INJECTION,THERAP/PROPH/DIAG2ST, IM OR SUBCUT: ICD-10-PCS | Mod: S$GLB,,, | Performed by: PHYSICIAN ASSISTANT

## 2021-05-19 PROCEDURE — 96372 THER/PROPH/DIAG INJ SC/IM: CPT | Mod: S$GLB,,, | Performed by: PHYSICIAN ASSISTANT

## 2021-05-19 PROCEDURE — 3008F PR BODY MASS INDEX (BMI) DOCUMENTED: ICD-10-PCS | Mod: CPTII,S$GLB,, | Performed by: PHYSICIAN ASSISTANT

## 2021-05-19 PROCEDURE — 99214 OFFICE O/P EST MOD 30 MIN: CPT | Mod: 25,S$GLB,, | Performed by: PHYSICIAN ASSISTANT

## 2021-05-19 PROCEDURE — U0002: ICD-10-PCS | Mod: QW,S$GLB,, | Performed by: PHYSICIAN ASSISTANT

## 2021-05-19 PROCEDURE — 99214 PR OFFICE/OUTPT VISIT, EST, LEVL IV, 30-39 MIN: ICD-10-PCS | Mod: 25,S$GLB,, | Performed by: PHYSICIAN ASSISTANT

## 2021-05-19 RX ORDER — BENZONATATE 100 MG/1
100 CAPSULE ORAL EVERY 6 HOURS PRN
Qty: 28 CAPSULE | Refills: 0 | Status: SHIPPED | OUTPATIENT
Start: 2021-05-19 | End: 2021-05-26

## 2021-05-19 RX ORDER — BETAMETHASONE SODIUM PHOSPHATE AND BETAMETHASONE ACETATE 3; 3 MG/ML; MG/ML
9 INJECTION, SUSPENSION INTRA-ARTICULAR; INTRALESIONAL; INTRAMUSCULAR; SOFT TISSUE ONCE
Status: COMPLETED | OUTPATIENT
Start: 2021-05-19 | End: 2021-05-19

## 2021-05-19 RX ORDER — PROMETHAZINE HYDROCHLORIDE AND DEXTROMETHORPHAN HYDROBROMIDE 6.25; 15 MG/5ML; MG/5ML
5 SYRUP ORAL
Qty: 118 ML | Refills: 0 | Status: SHIPPED | OUTPATIENT
Start: 2021-05-19 | End: 2021-05-26

## 2021-05-19 RX ADMIN — BETAMETHASONE SODIUM PHOSPHATE AND BETAMETHASONE ACETATE 9 MG: 3; 3 INJECTION, SUSPENSION INTRA-ARTICULAR; INTRALESIONAL; INTRAMUSCULAR; SOFT TISSUE at 09:05

## 2021-07-17 ENCOUNTER — OFFICE VISIT (OUTPATIENT)
Dept: URGENT CARE | Facility: CLINIC | Age: 56
End: 2021-07-17
Payer: COMMERCIAL

## 2021-07-17 VITALS
SYSTOLIC BLOOD PRESSURE: 125 MMHG | DIASTOLIC BLOOD PRESSURE: 84 MMHG | OXYGEN SATURATION: 97 % | RESPIRATION RATE: 16 BRPM | BODY MASS INDEX: 32.14 KG/M2 | TEMPERATURE: 99 F | HEIGHT: 66 IN | WEIGHT: 200 LBS | HEART RATE: 77 BPM

## 2021-07-17 DIAGNOSIS — K04.7 DENTAL ABSCESS: Primary | ICD-10-CM

## 2021-07-17 DIAGNOSIS — K08.89 PAIN, DENTAL: ICD-10-CM

## 2021-07-17 PROCEDURE — 3008F BODY MASS INDEX DOCD: CPT | Mod: CPTII,S$GLB,, | Performed by: PHYSICIAN ASSISTANT

## 2021-07-17 PROCEDURE — 3008F PR BODY MASS INDEX (BMI) DOCUMENTED: ICD-10-PCS | Mod: CPTII,S$GLB,, | Performed by: PHYSICIAN ASSISTANT

## 2021-07-17 PROCEDURE — 99214 OFFICE O/P EST MOD 30 MIN: CPT | Mod: 25,S$GLB,, | Performed by: PHYSICIAN ASSISTANT

## 2021-07-17 PROCEDURE — 99214 PR OFFICE/OUTPT VISIT, EST, LEVL IV, 30-39 MIN: ICD-10-PCS | Mod: 25,S$GLB,, | Performed by: PHYSICIAN ASSISTANT

## 2021-07-17 PROCEDURE — 96372 PR INJECTION,THERAP/PROPH/DIAG2ST, IM OR SUBCUT: ICD-10-PCS | Mod: S$GLB,,, | Performed by: PHYSICIAN ASSISTANT

## 2021-07-17 PROCEDURE — 96372 THER/PROPH/DIAG INJ SC/IM: CPT | Mod: S$GLB,,, | Performed by: PHYSICIAN ASSISTANT

## 2021-07-17 RX ORDER — KETOROLAC TROMETHAMINE 30 MG/ML
30 INJECTION, SOLUTION INTRAMUSCULAR; INTRAVENOUS
Status: COMPLETED | OUTPATIENT
Start: 2021-07-17 | End: 2021-07-17

## 2021-07-17 RX ORDER — CLINDAMYCIN HYDROCHLORIDE 300 MG/1
300 CAPSULE ORAL 3 TIMES DAILY
Qty: 21 CAPSULE | Refills: 0 | Status: SHIPPED | OUTPATIENT
Start: 2021-07-17 | End: 2021-07-24

## 2021-07-17 RX ADMIN — KETOROLAC TROMETHAMINE 30 MG: 30 INJECTION, SOLUTION INTRAMUSCULAR; INTRAVENOUS at 02:07

## 2022-04-19 NOTE — BRIEF OP NOTE
[facility]  Brief Operative Note     SUMMARY     Surgery Date: 9/12/2018     Surgeon(s) and Role:     * Yuval Cadean MD - Primary    Assisting Surgeon: None    Pre-op Diagnosis:  Biliary colic [K80.50]    Post-op Diagnosis:  Post-Op Diagnosis Codes:   Cholecystitis    Procedure(s) (LRB):  CHOLECYSTECTOMY, LAPAROSCOPIC (N/A)    Anesthesia: General    Description of the findings of the procedure: Inflamed gallbladder with cholelithiasis.    Findings/Key Components: see above    Estimated Blood Loss:see above         Specimens:   Specimen (12h ago, onward)    Start     Ordered    09/12/18 0728  Specimen to Pathology - Surgery  Once     Comments:  Pre-op Diagnosis: Biliary colic [K80.50]Post-op Diagnosis: unknownProcedure(s):CHOLECYSTECTOMY, LAPAROSCOPIC Number of specimens: 1Name of specimens: 1. gallbladder     Start Status   09/12/18 0728 Collected (09/12/18 0747)       09/12/18 0728          Discharge Note    SUMMARY     Admit Date: 9/12/2018    Discharge Date and Time:  09/12/2018 7:58 AM    Hospital Course (synopsis of major diagnoses, care, treatment, and services provided during the course of the hospital stay): Pt presented for lap faustina.  Procedure and post op course were uneventful.      Final Diagnosis: Post-Op Diagnosis Codes:     * Biliary colic [K80.50]    Disposition: Home or Self Care    Follow Up/Patient Instructions:     Medications:  Reconciled Home Medications:      Medication List      START taking these medications    HYDROcodone-acetaminophen 5-325 mg per tablet  Commonly known as:  NORCO  Take 1 tablet by mouth every 6 (six) hours as needed for Pain.        CONTINUE taking these medications    acetaminophen 650 MG Tbsr  Commonly known as:  TYLENOL  Take 1 tablet (650 mg total) by mouth 2 (two) times daily as needed (for mild to moderate pain).     hyoscyamine 0.125 mg Subl  Commonly known as:  LEVSIN/SL  Place 2 tablets (0.25 mg total) under the tongue every 6 (six) hours  as needed.     loratadine 10 mg tablet  Commonly known as:  CLARITIN  Take 10 mg by mouth daily as needed for Allergies.     losartan-hydrochlorothiazide 100-25 mg 100-25 mg per tablet  Commonly known as:  HYZAAR  Take 0.5 tablets by mouth every morning.     meloxicam 15 MG tablet  Commonly known as:  MOBIC  TAKE 1 TABLET(15 MG) BY MOUTH EVERY DAY     testosterone cypionate 200 mg/mL injection  Commonly known as:  DEPOTESTOTERONE CYPIONATE          Discharge Procedure Orders   Diet general     Call MD for:  extreme fatigue     Call MD for:  persistent dizziness or light-headedness     Call MD for:  hives     Call MD for:  redness, tenderness, or signs of infection (pain, swelling, redness, odor or green/yellow discharge around incision site)     Call MD for:  difficulty breathing, headache or visual disturbances     Call MD for:  severe uncontrolled pain     Call MD for:  persistent nausea and vomiting     Call MD for:  temperature >100.4     Remove dressing in 48 hours     Activity as tolerated     Follow-up Information     Yuval Cadena MD.    Specialties:  General Surgery, Colon and Rectal Surgery, Surgery  Contact information:  [address] [place] [address] [phone]                  Detail Level: Simple Render Risk Assessment In Note?: no Patient Management Risk Assessment: Low Additional Notes: She switched back to the original  of her lamotrigine last week.  She will give it time to determine if areas clear up.   If not, then may need to re evaluate, and at the very least, try a stronger topical steroid. If no improvement discussed possible early psoriasis, pityriasis dx (she had COVID a few months prior) or mild nummular eczema. \\n\\nContinue with triamcinolone few times a week.

## 2022-08-05 PROBLEM — E78.00 HYPERCHOLESTEROLEMIA: Status: ACTIVE | Noted: 2022-08-05

## 2022-09-30 ENCOUNTER — OFFICE VISIT (OUTPATIENT)
Dept: DERMATOLOGY | Facility: CLINIC | Age: 57
End: 2022-09-30
Payer: COMMERCIAL

## 2022-09-30 VITALS — HEIGHT: 66 IN | BODY MASS INDEX: 31.34 KG/M2 | WEIGHT: 195 LBS

## 2022-09-30 DIAGNOSIS — D22.9 MULTIPLE BENIGN NEVI: ICD-10-CM

## 2022-09-30 DIAGNOSIS — L81.4 SOLAR LENTIGO: ICD-10-CM

## 2022-09-30 DIAGNOSIS — L82.1 SEBORRHEIC KERATOSES: ICD-10-CM

## 2022-09-30 DIAGNOSIS — D48.5 NEOPLASM OF UNCERTAIN BEHAVIOR OF SKIN: Primary | ICD-10-CM

## 2022-09-30 PROCEDURE — 4010F ACE/ARB THERAPY RXD/TAKEN: CPT | Mod: CPTII,S$GLB,, | Performed by: DERMATOLOGY

## 2022-09-30 PROCEDURE — 11102 TANGNTL BX SKIN SINGLE LES: CPT | Mod: S$GLB,,, | Performed by: DERMATOLOGY

## 2022-09-30 PROCEDURE — 11102 PR TANGENTIAL BIOPSY, SKIN, SINGLE LESION: ICD-10-PCS | Mod: S$GLB,,, | Performed by: DERMATOLOGY

## 2022-09-30 PROCEDURE — 1159F MED LIST DOCD IN RCRD: CPT | Mod: CPTII,S$GLB,, | Performed by: DERMATOLOGY

## 2022-09-30 PROCEDURE — 3008F BODY MASS INDEX DOCD: CPT | Mod: CPTII,S$GLB,, | Performed by: DERMATOLOGY

## 2022-09-30 PROCEDURE — 88305 TISSUE EXAM BY PATHOLOGIST: CPT | Mod: 26,,, | Performed by: PATHOLOGY

## 2022-09-30 PROCEDURE — 99999 PR PBB SHADOW E&M-EST. PATIENT-LVL III: CPT | Mod: PBBFAC,,, | Performed by: DERMATOLOGY

## 2022-09-30 PROCEDURE — 4010F PR ACE/ARB THEARPY RXD/TAKEN: ICD-10-PCS | Mod: CPTII,S$GLB,, | Performed by: DERMATOLOGY

## 2022-09-30 PROCEDURE — 99203 OFFICE O/P NEW LOW 30 MIN: CPT | Mod: 25,S$GLB,, | Performed by: DERMATOLOGY

## 2022-09-30 PROCEDURE — 1160F RVW MEDS BY RX/DR IN RCRD: CPT | Mod: CPTII,S$GLB,, | Performed by: DERMATOLOGY

## 2022-09-30 PROCEDURE — 1159F PR MEDICATION LIST DOCUMENTED IN MEDICAL RECORD: ICD-10-PCS | Mod: CPTII,S$GLB,, | Performed by: DERMATOLOGY

## 2022-09-30 PROCEDURE — 99999 PR PBB SHADOW E&M-EST. PATIENT-LVL III: ICD-10-PCS | Mod: PBBFAC,,, | Performed by: DERMATOLOGY

## 2022-09-30 PROCEDURE — 1160F PR REVIEW ALL MEDS BY PRESCRIBER/CLIN PHARMACIST DOCUMENTED: ICD-10-PCS | Mod: CPTII,S$GLB,, | Performed by: DERMATOLOGY

## 2022-09-30 PROCEDURE — 3008F PR BODY MASS INDEX (BMI) DOCUMENTED: ICD-10-PCS | Mod: CPTII,S$GLB,, | Performed by: DERMATOLOGY

## 2022-09-30 PROCEDURE — 88305 TISSUE EXAM BY PATHOLOGIST: CPT | Performed by: PATHOLOGY

## 2022-09-30 PROCEDURE — 88305 TISSUE EXAM BY PATHOLOGIST: ICD-10-PCS | Mod: 26,,, | Performed by: PATHOLOGY

## 2022-09-30 PROCEDURE — 99203 PR OFFICE/OUTPT VISIT, NEW, LEVL III, 30-44 MIN: ICD-10-PCS | Mod: 25,S$GLB,, | Performed by: DERMATOLOGY

## 2022-09-30 NOTE — PROGRESS NOTES
Subjective:       Patient ID:  Lucien Linares is a 56 y.o. male who presents for   Chief Complaint   Patient presents with    Skin Check     UBSC     New Patient    Patient here today for UBSC  C/O spot to left ear x 3-4 weeks. Slightly bothersome, itchy and crusty.    Derm Hx:  Denies Phx of NMSC  Denies Fhx of MM    Current Outpatient Medications:   ·  acetaminophen (TYLENOL) 650 MG TbSR, Take 1 tablet (650 mg total) by mouth 2 (two) times daily as needed (for mild to moderate pain)., Disp: , Rfl: 0  ·  aspirin (ECOTRIN) 81 MG EC tablet, Take 1 tablet (81 mg total) by mouth once daily., Disp: , Rfl: 0  ·  hydroCHLOROthiazide (MICROZIDE) 12.5 mg capsule, Take 1 capsule (12.5 mg total) by mouth every morning., Disp: 90 capsule, Rfl: 3  ·  lisdexamfetamine (VYVANSE) 30 MG capsule, Take 1 capsule (30 mg total) by mouth every morning., Disp: 30 capsule, Rfl: 0  ·  loratadine (CLARITIN) 10 mg tablet, Take 10 mg by mouth daily as needed for Allergies., Disp: , Rfl:   ·  losartan (COZAAR) 50 MG tablet, Take 1 tablet (50 mg total) by mouth every morning., Disp: 90 tablet, Rfl: 3  ·  meloxicam (MOBIC) 15 MG tablet, TAKE 1 TABLET(15 MG) BY MOUTH DAILY AS NEEDED FOR PAIN, Disp: 90 tablet, Rfl: 1  ·  metoprolol succinate (TOPROL-XL) 25 MG 24 hr tablet, Take 0.5-1 tablets (12.5-25 mg total) by mouth once daily., Disp: 90 tablet, Rfl: 3  ·  rosuvastatin (CRESTOR) 10 MG tablet, Take 1 tablet (10 mg total) by mouth every evening., Disp: 90 tablet, Rfl: 3  ·  testosterone cypionate (DEPOTESTOTERONE CYPIONATE) 200 mg/mL injection, INJECT 1 ML IN MUSCLE EVERY 14 DAYS, Disp: 6 mL, Rfl: 1        Review of Systems   Constitutional:  Negative for fever, chills and fatigue.   Skin:  Positive for wears hat. Negative for itching, dry skin and daily sunscreen use.   Hematologic/Lymphatic: Does not bruise/bleed easily.      Objective:    Physical Exam   Constitutional: He appears well-developed and well-nourished. No distress.    Neurological: He is alert and oriented to person, place, and time. He is not disoriented.   Psychiatric: He has a normal mood and affect.   Skin:   Areas Examined (abnormalities noted in diagram):   Head / Face Inspection Performed  Neck Inspection Performed  Chest / Axilla Inspection Performed  Back Inspection Performed  RUE Inspected  LUE Inspection Performed                 Diagram Legend     Erythematous scaling macule/papule c/w actinic keratosis       Vascular papule c/w angioma      Pigmented verrucoid papule/plaque c/w seborrheic keratosis      Yellow umbilicated papule c/w sebaceous hyperplasia      Irregularly shaped tan macule c/w lentigo     1-2 mm smooth white papules consistent with Milia      Movable subcutaneous cyst with punctum c/w epidermal inclusion cyst      Subcutaneous movable cyst c/w pilar cyst      Firm pink to brown papule c/w dermatofibroma      Pedunculated fleshy papule(s) c/w skin tag(s)      Evenly pigmented macule c/w junctional nevus     Mildly variegated pigmented, slightly irregular-bordered macule c/w mildly atypical nevus      Flesh colored to evenly pigmented papule c/w intradermal nevus       Pink pearly papule/plaque c/w basal cell carcinoma      Erythematous hyperkeratotic cursted plaque c/w SCC      Surgical scar with no sign of skin cancer recurrence      Open and closed comedones      Inflammatory papules and pustules      Verrucoid papule consistent consistent with wart     Erythematous eczematous patches and plaques     Dystrophic onycholytic nail with subungual debris c/w onychomycosis     Umbilicated papule    Erythematous-base heme-crusted tan verrucoid plaque consistent with inflamed seborrheic keratosis     Erythematous Silvery Scaling Plaque c/w Psoriasis     See annotation        Assessment / Plan:      Pathology Orders:       Normal Orders This Visit    Specimen to Pathology, Dermatology     Questions:    Procedure Type: Dermatology and skin neoplasms     Number of Specimens: 1    ------------------------: -------------------------    Spec 1 Procedure: Biopsy    Spec 1 Clinical Impression: Erythematous scaly patch, non healing x 1 month, sccis vs inflammatory    Spec 1 Source: left antihelix    Release to patient:           Neoplasm of uncertain behavior of skin  -     Specimen to Pathology, Dermatology  Shave biopsy procedure note:    Shave biopsy performed after verbal consent including risk of infection, scar, recurrence, need for additional treatment of site. Area prepped with alcohol, anesthetized with approximately 1.0cc of 1% lidocaine with epinephrine. Lesional tissue shaved with razor blade. Hemostasis achieved with application of aluminum chloride followed by hyfrecation. No complications. Dressing applied. Wound care explained.    Seborrheic keratoses  These are benign inherited growths without a malignant potential. Reassurance given to patient. No treatment is necessary.     Multiple benign nevi  Careful dermoscopy evaluation of nevi performed with none identified as needing biopsy today  Monitor for new mole or moles that are becoming bigger, darker, irritated, or developing irregular borders.     Solar lentigo  This is a benign hyperpigmented sun induced lesion. Daily sun protection will reduce the number of new lesions. Treatment of these benign lesions are considered cosmetic.    Patient instructed in importance in daily broad spectrum sun protection of at least spf 30. Mineral sunscreen ingredients preferred (Zinc +/- Titanium) and can be found OTC.   Recommend Elta MD for daily use on face and neck.  Patient encouraged to wear hat for all outdoor exposure.   Also discussed sun avoidance and use of protective clothing.           Follow up if symptoms worsen or fail to improve.

## 2022-09-30 NOTE — PATIENT INSTRUCTIONS
Shave Biopsy Wound Care    Your doctor has performed a shave biopsy today.  A band aid and vaseline ointment has been placed over the site.  This should remain in place for 24 hours.  It is recommended that you keep the area dry for the first 24 hours.  After 24 hours, you may remove the band aid and wash the area with warm soap and water and apply Vaseline jelly.  Many patients prefer to use Neosporin or Bacitracin ointment.  This is acceptable; however, know that you can develop an allergy to this medication even if you have used it safely for years.  It is important to keep the area moist.  Letting it dry out and get air slows healing time, and will worsen the scar.  Band aid is optional after first 24 hours.      If you notice increasing redness, tenderness, pain, or yellow drainage at the biopsy site, please notify your doctor.  These are signs of an infection.    If your biopsy site is bleeding, apply firm pressure for 15 minutes straight.  Repeat for another 15 minutes, if it is still bleeding.   If the surgical site continues to bleed, then please contact your doctor.       [facility]  [facility]ERMAT[facility]  [address] [place] [address], [address] [address]  Dept: [phone]

## 2022-10-04 LAB
FINAL PATHOLOGIC DIAGNOSIS: NORMAL
GROSS: NORMAL
Lab: NORMAL
MICROSCOPIC EXAM: NORMAL

## 2022-10-05 ENCOUNTER — TELEPHONE (OUTPATIENT)
Dept: DERMATOLOGY | Facility: CLINIC | Age: 57
End: 2022-10-05
Payer: COMMERCIAL

## 2022-10-05 RX ORDER — FLUOROURACIL 50 MG/G
CREAM TOPICAL
Qty: 40 G | Refills: 0 | Status: SHIPPED | OUTPATIENT
Start: 2022-10-05 | End: 2023-11-24

## 2022-10-05 NOTE — TELEPHONE ENCOUNTER
Attempted to contact patient, no answer, lvm for a return call.     ----- Message from Joaquina Lopez MD sent at 10/4/2022  6:23 PM CDT -----  Precancerous lesion. Please allow healing of biopsy site, then treat with topical chemotherapeutic cream  Our options are:  - try the generic fluorouracil at your local pharmacy for pricing. Copay can be as low as $5 or as high as $150 depending on your plan. It is applied twice daily for 4 weeks. [facility] pharmacy can provide for $40+ shipping  - send prescription to [facility] in [place] who fills brand Tol[place] for $40 plus shipping; it is applied once daily for 2 weeks, take a break, repeat for 2 weeks  - send prescription to a compounding pharmacy ([facility]) who makes a fluorouracil-calcipotriene combination cream for $45 shipped to your house ; the second ingredient allows an abbreviated course and only needs to be applied twice daily for 7 days.  The latter is the best if the price is acceptable and you value a shorter treatment course.    Skin, left antihelix, shave biopsy:   -ACTINIC KERATOSIS (EXCORIATED AND INFLAMED) WITH UNDERLYING ELASTOTIC   TELANGIECTATIC CHANGES, see comment   COMMENT:  The elastotic telangiectatic changes can be seen as a result of   severe actinic damage.  Occasionally the clinical lesion corresponds to a   papule or plaque of solar elastosis

## 2022-10-13 ENCOUNTER — PATIENT MESSAGE (OUTPATIENT)
Dept: DERMATOLOGY | Facility: CLINIC | Age: 57
End: 2022-10-13
Payer: COMMERCIAL

## 2022-10-24 PROBLEM — K80.50 BILIARY COLIC: Status: RESOLVED | Noted: 2018-09-12 | Resolved: 2022-10-24

## 2022-10-24 PROBLEM — C44.219 BASAL CELL CARCINOMA, EAR, LEFT: Status: ACTIVE | Noted: 2022-10-24

## 2023-06-04 ENCOUNTER — OFFICE VISIT (OUTPATIENT)
Dept: URGENT CARE | Facility: CLINIC | Age: 58
End: 2023-06-04
Payer: COMMERCIAL

## 2023-06-04 VITALS
OXYGEN SATURATION: 97 % | WEIGHT: 195 LBS | BODY MASS INDEX: 31.34 KG/M2 | SYSTOLIC BLOOD PRESSURE: 148 MMHG | HEART RATE: 99 BPM | TEMPERATURE: 98 F | DIASTOLIC BLOOD PRESSURE: 96 MMHG | RESPIRATION RATE: 18 BRPM | HEIGHT: 66 IN

## 2023-06-04 DIAGNOSIS — M79.645 FINGER PAIN, LEFT: ICD-10-CM

## 2023-06-04 DIAGNOSIS — L60.0 INGROWING NAIL WITH INFECTION: Primary | ICD-10-CM

## 2023-06-04 PROCEDURE — 11730 NAIL REMOVAL: ICD-10-PCS | Mod: S$GLB,,, | Performed by: PHYSICIAN ASSISTANT

## 2023-06-04 PROCEDURE — S0119 PR ONDANSETRON, ORAL, 4MG: ICD-10-PCS | Mod: S$GLB,,, | Performed by: PHYSICIAN ASSISTANT

## 2023-06-04 PROCEDURE — 11730 AVULSION NAIL PLATE SIMPLE 1: CPT | Mod: S$GLB,,, | Performed by: PHYSICIAN ASSISTANT

## 2023-06-04 PROCEDURE — S0119 ONDANSETRON 4 MG: HCPCS | Mod: S$GLB,,, | Performed by: PHYSICIAN ASSISTANT

## 2023-06-04 PROCEDURE — 99213 OFFICE O/P EST LOW 20 MIN: CPT | Mod: 25,S$GLB,, | Performed by: PHYSICIAN ASSISTANT

## 2023-06-04 PROCEDURE — 99213 PR OFFICE/OUTPT VISIT, EST, LEVL III, 20-29 MIN: ICD-10-PCS | Mod: 25,S$GLB,, | Performed by: PHYSICIAN ASSISTANT

## 2023-06-04 RX ORDER — ONDANSETRON 4 MG/1
8 TABLET, ORALLY DISINTEGRATING ORAL ONCE
Status: COMPLETED | OUTPATIENT
Start: 2023-06-04 | End: 2023-06-04

## 2023-06-04 RX ORDER — SULFAMETHOXAZOLE AND TRIMETHOPRIM 800; 160 MG/1; MG/1
1 TABLET ORAL 2 TIMES DAILY
Qty: 20 TABLET | Refills: 0 | Status: SHIPPED | OUTPATIENT
Start: 2023-06-04 | End: 2023-06-14

## 2023-06-04 RX ORDER — HYDROCODONE BITARTRATE AND ACETAMINOPHEN 5; 325 MG/1; MG/1
1 TABLET ORAL EVERY 6 HOURS PRN
Qty: 20 TABLET | Refills: 0 | Status: SHIPPED | OUTPATIENT
Start: 2023-06-04 | End: 2023-06-09

## 2023-06-04 RX ADMIN — ONDANSETRON 8 MG: 4 TABLET, ORALLY DISINTEGRATING ORAL at 11:06

## 2023-06-04 NOTE — PROCEDURES
Nail Removal    Date/Time: 6/4/2023 10:15 AM  Performed by: FRACISCO Hobson  Authorized by: FRACISCO Hobson     Consent Done?:  Yes (Verbal)  Location:     Location:  Left hand    Location detail:  Left long finger  Anesthesia:     Anesthesia:  Digital block    Local anesthetic:  Lidocaine 1% without epinephrine    Anesthetic total (ml):  5  Procedure Details:     Preparation:  Skin prepped with alcohol    Amount removed:  1/4    Side:  Lateral    Wedge excision of skin of nail fold: No      Nail bed sutured?: No      Nail matrix removed:  None    Removed nail replaced and anchored: No      Dressing applied:  Antibiotic ointment and gauze roll    Patient tolerance:  Patient tolerated the procedure well with no immediate complications     +ingrowing nail to lateral side removed to nail bed. Minimal blood loss. Pt tolerated well. Became nauseated and zofran was given. Pt felt better after procedure complete.

## 2023-06-04 NOTE — PATIENT INSTRUCTIONS
You must understand that you've received an Urgent Care treatment only and that you may be released before all your medical problems are known or treated. You, the patient, will arrange for follow up care as instructed.  Follow up with your PCP or specialty clinic as directed if not improved or as needed. You can call [phone] to schedule an appointment with the appropriate provider.  If your condition worsens we recommend that you receive another evaluation at the Emergency Department for any concerns or worsening of condition.  Patient aware and verbalized understanding.     Drink plenty of fluids and get lots of rest.  Avoid picking/manipulating the wound.   Take antibiotics RX as prescribed to full completion.  Please clean with regular soap and water and then apply Bactroban ointment to non-adhesive dressing and cover with a bandage.  Cover to avoid friction constantly rubbing up against the area of irritation.  Follow-up with your PCP and/or Specialist for further evaluation as needed.  ER precautions given to patient.  Patient aware, verbalized understanding and agreed with plan of care.

## 2023-06-04 NOTE — PROGRESS NOTES
"Subjective:      Patient ID: Lucien Linares is a 57 y.o. male.    Vitals:  height is 5' 6" (1.676 m) and weight is 88.5 kg (195 lb). His temporal temperature is 98 °F (36.7 °C). His blood pressure is 148/96 (abnormal) and his pulse is 99. His respiration is 18 and oxygen saturation is 97%.     Chief Complaint: Nail Problem    Patient presents today with complaints of left middle fingernail infection x 3 days. Patient is not currently taking anything for his symptoms. Redness is expanding down finger. +purulent drainage. No fever. FROM    Nail Problem  This is a new problem. The current episode started in the past 7 days. Pertinent negatives include no abdominal pain, arthralgias, chest pain, chills, congestion, coughing, diaphoresis, fatigue, fever, headaches, joint swelling, myalgias, nausea, neck pain, numbness, rash, sore throat or vomiting.     Constitution: Negative for chills, sweating, fatigue and fever.   HENT:  Negative for ear pain, drooling, congestion, sore throat, trouble swallowing and voice change.    Neck: Negative for neck pain, neck stiffness and painful lymph nodes.   Cardiovascular:  Negative for chest pain, leg swelling, palpitations, sob on exertion and passing out.   Eyes:  Negative for eye discharge, eye itching, eye pain, eye redness and eyelid swelling.   Respiratory:  Negative for chest tightness, cough, sputum production, bloody sputum, shortness of breath, stridor and wheezing.    Gastrointestinal:  Negative for abdominal pain, abdominal bloating, nausea, vomiting, constipation, diarrhea and heartburn.   Genitourinary:  Negative for urine decreased.   Musculoskeletal:  Negative for joint pain, joint swelling, abnormal ROM of joint, pain with walking, muscle cramps and muscle ache.   Skin:  Positive for wound and erythema. Negative for rash and hives.   Allergic/Immunologic: Negative for hives, itching and sneezing.   Neurological:  Negative for dizziness, light-headedness, passing out, " loss of balance, headaches, altered mental status, loss of consciousness, numbness and seizures.   Hematologic/Lymphatic: Negative for swollen lymph nodes.   Psychiatric/Behavioral:  Negative for altered mental status and nervous/anxious. The patient is not nervous/anxious.     Objective:     Physical Exam   Constitutional: He is oriented to person, place, and time. He appears well-developed.   HENT:   Head: Normocephalic and atraumatic. Head is without abrasion, without contusion and without laceration.   Ears:   Right Ear: External ear normal.   Left Ear: External ear normal.   Nose: Nose normal.   Mouth/Throat: Oropharynx is clear and moist and mucous membranes are normal.   Eyes: Conjunctivae, EOM and lids are normal. Pupils are equal, round, and reactive to light.   Neck: Trachea normal and phonation normal. Neck supple.   Cardiovascular: Normal rate, regular rhythm and normal heart sounds.   Pulmonary/Chest: Effort normal and breath sounds normal. No stridor. No respiratory distress.   Musculoskeletal: Normal range of motion.         General: Tenderness present. Normal range of motion.   Neurological: He is alert and oriented to person, place, and time.   Skin: Skin is warm, dry, intact and no rash. Capillary refill takes less than 2 seconds. erythema No abrasion, No burn, No bruising and No ecchymosis         Comments: + ingrowing nail to 3rd middle finger on left hand laterally. +swelling with purulent drainage and surrounding cellulitis.   Psychiatric: His speech is normal and behavior is normal. Judgment and thought content normal.   Nursing note and vitals reviewed.    Nail Removal    Date/Time: 6/4/2023 10:15 AM  Performed by: FRACISCO Hobson  Authorized by: FRACISCO Hobson     Consent Done?:  Yes (Verbal)  Location:     Location:  Left hand    Location detail:  Left long finger  Anesthesia:     Anesthesia:  Digital block    Local anesthetic:  Lidocaine 1% without  epinephrine    Anesthetic total (ml):  5  Procedure Details:     Preparation:  Skin prepped with alcohol    Amount removed:  1/4    Side:  Lateral    Wedge excision of skin of nail fold: No      Nail bed sutured?: No      Nail matrix removed:  None    Removed nail replaced and anchored: No      Dressing applied:  Antibiotic ointment and gauze roll    Patient tolerance:  Patient tolerated the procedure well with no immediate complications     +ingrowing nail to lateral side removed to nail bed. Minimal blood loss. Pt tolerated well. Became nauseated and zofran was given. Pt felt better after procedure complete.     Assessment:     1. Ingrowing nail with infection    2. Finger pain, left        Plan:       Ingrowing nail with infection  -     sulfamethoxazole-trimethoprim 800-160mg (BACTRIM DS) 800-160 mg Tab; Take 1 tablet by mouth 2 (two) times daily. for 10 days  Dispense: 20 tablet; Refill: 0  -     Nail Removal    Finger pain, left  -     HYDROcodone-acetaminophen (NORCO) 5-325 mg per tablet; Take 1 tablet by mouth every 6 (six) hours as needed for Pain.  Dispense: 20 tablet; Refill: 0    Other orders  -     ondansetron disintegrating tablet 8 mg      Patient Instructions   You must understand that you've received an Urgent Care treatment only and that you may be released before all your medical problems are known or treated. You, the patient, will arrange for follow up care as instructed.  Follow up with your PCP or specialty clinic as directed if not improved or as needed. You can call [phone] to schedule an appointment with the appropriate provider.  If your condition worsens we recommend that you receive another evaluation at the Emergency Department for any concerns or worsening of condition.  Patient aware and verbalized understanding.     Drink plenty of fluids and get lots of rest.  Avoid picking/manipulating the wound.   Take antibiotics RX as prescribed to full completion.  Please clean with  regular soap and water and then apply Bactroban ointment to non-adhesive dressing and cover with a bandage.  Cover to avoid friction constantly rubbing up against the area of irritation.  Follow-up with your PCP and/or Specialist for further evaluation as needed.  ER precautions given to patient.  Patient aware, verbalized understanding and agreed with plan of care.

## 2023-06-30 ENCOUNTER — OFFICE VISIT (OUTPATIENT)
Dept: OPTOMETRY | Facility: CLINIC | Age: 58
End: 2023-06-30
Payer: COMMERCIAL

## 2023-06-30 DIAGNOSIS — H57.89 IRRITATION OF RIGHT EYE: ICD-10-CM

## 2023-06-30 DIAGNOSIS — T15.11XA FOREIGN BODY OF RIGHT CONJUNCTIVA, INITIAL ENCOUNTER: Primary | ICD-10-CM

## 2023-06-30 PROCEDURE — 99213 PR OFFICE/OUTPT VISIT, EST, LEVL III, 20-29 MIN: ICD-10-PCS | Mod: S$GLB,,,

## 2023-06-30 PROCEDURE — 99213 OFFICE O/P EST LOW 20 MIN: CPT | Mod: S$GLB,,,

## 2023-06-30 PROCEDURE — 99999 PR PBB SHADOW E&M-EST. PATIENT-LVL III: ICD-10-PCS | Mod: PBBFAC,,,

## 2023-06-30 PROCEDURE — 4010F PR ACE/ARB THEARPY RXD/TAKEN: ICD-10-PCS | Mod: CPTII,S$GLB,,

## 2023-06-30 PROCEDURE — 99999 PR PBB SHADOW E&M-EST. PATIENT-LVL III: CPT | Mod: PBBFAC,,,

## 2023-06-30 PROCEDURE — 1159F MED LIST DOCD IN RCRD: CPT | Mod: CPTII,S$GLB,,

## 2023-06-30 PROCEDURE — 1159F PR MEDICATION LIST DOCUMENTED IN MEDICAL RECORD: ICD-10-PCS | Mod: CPTII,S$GLB,,

## 2023-06-30 PROCEDURE — 4010F ACE/ARB THERAPY RXD/TAKEN: CPT | Mod: CPTII,S$GLB,,

## 2023-06-30 RX ORDER — TOBRAMYCIN AND DEXAMETHASONE 3; 1 MG/ML; MG/ML
1 SUSPENSION/ DROPS OPHTHALMIC 4 TIMES DAILY
Qty: 2.5 ML | Refills: 0 | Status: SHIPPED | OUTPATIENT
Start: 2023-06-30 | End: 2023-07-05

## 2023-06-30 NOTE — PROGRESS NOTES
HPI    Pt here for FBS OD since Tuesday    Pt sts it is not painful, but irritating. Pt cut grass a few days ago and   does not know if something got into eye, or if it is the beginning of a   stye. Pt using Zaditor BID.   Last edited by Sharmila Culver on 6/30/2023  9:02 AM.            Assessment /Plan     For exam results, see Encounter Report.    Foreign body of right conjunctiva, initial encounter  -     tobramycin-dexAMETHasone 0.3-0.1% (TOBRADEX) 0.3-0.1 % DrpS; Place 1 drop into the right eye 4 (four) times daily. for 5 days  Dispense: 2.5 mL; Refill: 0    Irritation of right eye      1-2. Small foreign body of organic material found on lid eversion of the RUL. Successfully removed at the slit lamp using topical proparacaine and a cotton tipped applicator. Pt tolerated well and got relief. No staining of cornea. Rx'd Tobradex to use QID OD x 5 days. Ed pt to call or message if no improvement or worsening of symptoms.    RTC: in-office prn

## 2023-11-24 PROBLEM — Z00.00 PREVENTATIVE HEALTH CARE: Status: ACTIVE | Noted: 2023-11-24

## 2023-11-24 PROBLEM — M19.042 ARTHRITIS OF BOTH HANDS: Status: ACTIVE | Noted: 2023-11-24

## 2023-11-24 PROBLEM — M19.041 ARTHRITIS OF BOTH HANDS: Status: ACTIVE | Noted: 2023-11-24

## 2023-11-24 PROBLEM — R00.0 SINUS TACHYCARDIA: Status: ACTIVE | Noted: 2023-11-24

## 2023-11-24 PROBLEM — Z00.00 PREVENTATIVE HEALTH CARE: Status: RESOLVED | Noted: 2023-11-24 | Resolved: 2023-11-24

## 2023-12-22 ENCOUNTER — OFFICE VISIT (OUTPATIENT)
Dept: ORTHOPEDICS | Facility: CLINIC | Age: 58
End: 2023-12-22
Payer: COMMERCIAL

## 2023-12-22 ENCOUNTER — HOSPITAL ENCOUNTER (OUTPATIENT)
Dept: RADIOLOGY | Facility: HOSPITAL | Age: 58
Discharge: HOME | End: 2023-12-22
Attending: ORTHOPAEDIC SURGERY
Payer: COMMERCIAL

## 2023-12-22 DIAGNOSIS — M19.049 HAND ARTHRITIS: ICD-10-CM

## 2023-12-22 DIAGNOSIS — M19.049 HAND ARTHRITIS: Primary | ICD-10-CM

## 2023-12-22 PROCEDURE — 99999 PR PBB SHADOW E&M-EST. PATIENT-LVL III: ICD-10-PCS | Mod: PBBFAC,,, | Performed by: ORTHOPAEDIC SURGERY

## 2023-12-22 PROCEDURE — 73130 X-RAY EXAM OF HAND: CPT | Mod: TC,50,PO

## 2023-12-22 PROCEDURE — 3044F HG A1C LEVEL LT 7.0%: CPT | Mod: CPTII,S$GLB,, | Performed by: ORTHOPAEDIC SURGERY

## 2023-12-22 PROCEDURE — 73130 X-RAY EXAM OF HAND: CPT | Mod: 26,50,, | Performed by: RADIOLOGY

## 2023-12-22 PROCEDURE — 99999 PR PBB SHADOW E&M-EST. PATIENT-LVL III: CPT | Mod: PBBFAC,,, | Performed by: ORTHOPAEDIC SURGERY

## 2023-12-22 PROCEDURE — 99204 PR OFFICE/OUTPT VISIT, NEW, LEVL IV, 45-59 MIN: ICD-10-PCS | Mod: S$GLB,,, | Performed by: ORTHOPAEDIC SURGERY

## 2023-12-22 PROCEDURE — 4010F ACE/ARB THERAPY RXD/TAKEN: CPT | Mod: CPTII,S$GLB,, | Performed by: ORTHOPAEDIC SURGERY

## 2023-12-22 PROCEDURE — 1159F MED LIST DOCD IN RCRD: CPT | Mod: CPTII,S$GLB,, | Performed by: ORTHOPAEDIC SURGERY

## 2023-12-22 PROCEDURE — 99204 OFFICE O/P NEW MOD 45 MIN: CPT | Mod: S$GLB,,, | Performed by: ORTHOPAEDIC SURGERY

## 2023-12-22 PROCEDURE — 1159F PR MEDICATION LIST DOCUMENTED IN MEDICAL RECORD: ICD-10-PCS | Mod: CPTII,S$GLB,, | Performed by: ORTHOPAEDIC SURGERY

## 2023-12-22 PROCEDURE — 3044F PR MOST RECENT HEMOGLOBIN A1C LEVEL <7.0%: ICD-10-PCS | Mod: CPTII,S$GLB,, | Performed by: ORTHOPAEDIC SURGERY

## 2023-12-22 PROCEDURE — 73130 XR HAND COMPLETE 3 VIEWS BILATERAL: ICD-10-PCS | Mod: 26,50,, | Performed by: RADIOLOGY

## 2023-12-22 PROCEDURE — 4010F PR ACE/ARB THEARPY RXD/TAKEN: ICD-10-PCS | Mod: CPTII,S$GLB,, | Performed by: ORTHOPAEDIC SURGERY

## 2023-12-22 NOTE — PROGRESS NOTES
"12/22/2023    Chief Complaint:  Chief Complaint   Patient presents with    Left Hand - Pain    Right Hand - Pain       HPI:  Lucien Linares is a 58 y.o. male, who presents to clinic today has a soreness deformity multiple fingertips.  Slowly it likely he did have a partial workup his primary he is here today for further evaluation and treatment options    PMHX:  Past Medical History:   Diagnosis Date    a Chronic Sinus Tachycardia 11/24/2023    Decreased His Testosterone Dose To See If This Resolves, Or May Need To Change Concerta To Focalin    a Family H/O Early CAD     6/2/21 RXd ASA 81 Mg Daily; 7/29/22 Cardiac CT Ca+ Score = 42.0 (See Report); His Brother Had An MI At Age 54    b Hypertension     10/19/17 RXd Guanfacine 1 Mg qAM And Decreased Losartan/HCTZ 100/25 Mg qAM To 1/2 Tab qAM; 5/5/17 Increased Losartan/HCTZ To 100/25 Mg qAM; 4/21/17 RXd Losartan/HCTZ 50/12.5 Mg qAM     c Hypercholesterolemia     His Goal LDL Is < 100 (Cardiac CT Ca+ Score); 8/5/22 RXd Crestor 10 Mg qHS With Labs In 6 Weeks    j Chronic Mildly Elevated ALT Levels     Am Monitoring    j Descending And Sigmoid Diverticulosis     Dr. Logan sanders GERD (gastroesophageal reflux disease)     j H/O Laparoscopic Cholecystectomy In 09/2018     Dr. Yuval sanders Irritable Bowel Syndrome     Dr. Logan Tsai Noted This During Patient's 6/29/16 TC: "Mild Colonic Spasm C/W IBS"    j Small Internal Hemorrhoids     Dr. Logan Tsai    k H/O Right Inguinal Hernia Repair In 2006     k Low Testosterone Levels ####    11/21/18 Decreased Testosterone To 100 Mg IM Every 2 Weeks; Dr. Valentín Augustin; On IM Testosterone Monthly; 10/7/16 Testosterone = 168 (195.0-1138.0)    l Bilateral Hand Arthritis     11/24/23 Referred To Dr. Israel Dominguez; 11/24/23 Rheum Labs = Ordered    l Chronic Left Shoulder Pain     7/13/17 Referred To Dr. Bethel Ornelas    l H/O Right Shoulder SX In 2011     Dr. Barragan At [facility]    n Attention Deficit Disorder     " 11/24/23 His Is Now Doing Well On No Treatment For This; 7/22/22 RXd Vyvanse 30 Mg qAM With Toprol-XL 12.5-25 Mg Daily; 10/23/18 RXd Concerta 18 Mg qAM; 10/19/17 D/Cd Vyvanse And RXd Guanfacine 1 Mg Daily (#90;Rx3); Wellbutrin- Mg Was Ineffective; Adderal-XR 20 Mg Increased His BP And HR; Vyvanse 50 Mg qAM Caused Increased BP; This DX Is Well Documented In His [facility] Epic EMR    n Depression     This Resolved On Concerta Alone; 10/23/18 RXd Concerta 18 Mg qAM, And Will See If This Is Benifitted By It    n Therapeutic Drug Monitoring     o Left Ear Basal Cell Carcinoma     Dr. Mcelroy (In [place]) Resected This In 09/2022 And Also Started Efudex Cream Topically    Wellness Visit 11/24/2023        PSHX:  Past Surgical History:   Procedure Laterality Date    COLONOSCOPY N/A 6/29/2016    Procedure: COLONOSCOPY;  Surgeon: Logan Tsai Jr., MD;  Location: [facility] ENDO;  Service: Endoscopy;  Laterality: N/A;    HERNIA REPAIR Right 2006    groin    LAPAROSCOPIC CHOLECYSTECTOMY N/A 9/12/2018    Procedure: CHOLECYSTECTOMY, LAPAROSCOPIC;  Surgeon: Yuval Cadena MD;  Location: [facility] OR;  Service: General;  Laterality: N/A;    SHOULDER SURGERY  2011    Right    TONSILLECTOMY         FMHX:  Family History   Problem Relation Age of Onset    Sleep apnea Brother     Heart disease Father 47    Hypertension Father     Cancer Mother         BREAST    Cataracts Paternal Grandmother     Glaucoma Paternal Grandmother     Glaucoma Paternal Grandfather     Amblyopia Neg Hx     Blindness Neg Hx     Diabetes Neg Hx     Macular degeneration Neg Hx     Retinal detachment Neg Hx     Strabismus Neg Hx     Stroke Neg Hx     Thyroid disease Neg Hx        SOCHX:  Social History     Tobacco Use    Smoking status: Never     Passive exposure: Never    Smokeless tobacco: Never   Substance Use Topics    Alcohol use: Yes     Alcohol/week: 0.8 standard drinks of alcohol     Types: 1 Standard drinks or equivalent per week     Comment: 1-2 / week        ALLERGIES:  Codeine, Penicillins, Adderall [dextroamphetamine-amphetamine], and Vyvanse [lisdexamfetamine]    CURRENT MEDICATIONS:  Current Outpatient Medications on File Prior to Visit   Medication Sig Dispense Refill    acetaminophen (TYLENOL) 650 MG TbSR Take 1 tablet (650 mg total) by mouth 2 (two) times daily as needed (for mild to moderate pain).  0    ergocalciferol, vitamin D2, (VITAMIN D ORAL) Take by mouth.      hydroCHLOROthiazide (MICROZIDE) 12.5 mg capsule TAKE 1 CAPSULE(12.5 MG) BY MOUTH EVERY MORNING 90 capsule 3    loratadine (CLARITIN) 10 mg tablet Take 10 mg by mouth daily as needed for Allergies.      losartan (COZAAR) 50 MG tablet TAKE 1 TABLET(50 MG) BY MOUTH EVERY MORNING 90 tablet 3    meloxicam (MOBIC) 15 MG tablet TAKE 1 TABLET(15 MG) BY MOUTH DAILY AS NEEDED FOR PAIN 90 tablet 1    multivitamin capsule Take 1 capsule by mouth once daily.      rosuvastatin (CRESTOR) 10 MG tablet TAKE 1 TABLET(10 MG) BY MOUTH EVERY EVENING 90 tablet 3    testosterone cypionate (DEPOTESTOTERONE CYPIONATE) 200 mg/mL injection INJECT 1 ML IN MUSCLE EVERY 14 DAYS 6 mL 3    aspirin (ECOTRIN) 81 MG EC tablet Take 1 tablet (81 mg total) by mouth once daily.  0    metoprolol succinate (TOPROL-XL) 25 MG 24 hr tablet Take 0.5-1 tablets (12.5-25 mg total) by mouth once daily. 90 tablet 3     No current facility-administered medications on file prior to visit.       REVIEW OF SYSTEMS:  Review of Systems   Constitutional: Negative.    HENT: Negative.     Eyes: Negative.    Respiratory: Negative.     Cardiovascular: Negative.    Gastrointestinal: Negative.    Genitourinary: Negative.    Musculoskeletal:  Positive for joint pain.   Skin: Negative.    Neurological:  Positive for weakness.   Endo/Heme/Allergies: Negative.    Psychiatric/Behavioral: Negative.       GENERAL PHYSICAL EXAM:   There were no vitals taken for this visit.   GEN: well developed, well nourished, no acute distress   HENT: Normocephalic,  atraumatic   EYES: No discharge, conjunctiva normal   NECK: Supple, non-tender   PULM: No wheezing, no respiratory distress   CV: RRR   ABD: Soft, non-tender    ORTHO EXAM:   Examination of bilateral hands reveals that there are deformities at the distal interphalangeal joints of multiple fingers on both hands.  Palpation over those joints only produces mild tenderness.  Is able to flex and extend those joints but there is some limitation in motion.  He does report grossly intact sensation and has capillary refill less than 2 seconds    RADIOLOGY:   X-rays of both hands were taken in clinic today.  He is noted have significant amount of degenerative change about multiple distal interphalangeal joints on both hands.  The proximal joints appear to be without significant pathology and there is no carpal arthritis noted.    ASSESSMENT:   Bilateral hand distal interphalangeal joint arthritis    PLAN:  1. Will order the remainder of the autoimmune panel to assess for any further autoimmune disorders     2. I have discussed treatment including anti-inflammatories, steroid injections, and fusion of the joints     3. I have reviewed the risks of long-term anti-inflammatory use.    4.  The patient is currently taking Mobic can continue to take that the we have suggested continuing to follow his blood work for kidney studies     5. Will review the results of the autoimmune panel if any of those are positive we may consider referral to Rheumatology

## 2024-01-09 ENCOUNTER — PATIENT MESSAGE (OUTPATIENT)
Dept: ORTHOPEDICS | Facility: CLINIC | Age: 59
End: 2024-01-09
Payer: COMMERCIAL

## 2024-01-09 DIAGNOSIS — R89.9 ABNORMAL LABORATORY TEST: Primary | ICD-10-CM

## 2024-05-28 ENCOUNTER — OFFICE VISIT (OUTPATIENT)
Dept: RHEUMATOLOGY | Facility: CLINIC | Age: 59
End: 2024-05-28
Payer: COMMERCIAL

## 2024-05-28 VITALS
WEIGHT: 204.56 LBS | DIASTOLIC BLOOD PRESSURE: 77 MMHG | SYSTOLIC BLOOD PRESSURE: 117 MMHG | OXYGEN SATURATION: 97 % | HEART RATE: 84 BPM | TEMPERATURE: 99 F | BODY MASS INDEX: 32.88 KG/M2 | RESPIRATION RATE: 18 BRPM | HEIGHT: 66 IN

## 2024-05-28 DIAGNOSIS — E66.09 CLASS 1 OBESITY DUE TO EXCESS CALORIES WITHOUT SERIOUS COMORBIDITY WITH BODY MASS INDEX (BMI) OF 33.0 TO 33.9 IN ADULT: ICD-10-CM

## 2024-05-28 DIAGNOSIS — M06.00 SERONEGATIVE RHEUMATOID ARTHRITIS: Primary | ICD-10-CM

## 2024-05-28 DIAGNOSIS — M19.90 INFLAMMATORY ARTHRITIS: ICD-10-CM

## 2024-05-28 DIAGNOSIS — R79.82 ELEVATED C-REACTIVE PROTEIN (CRP): ICD-10-CM

## 2024-05-28 DIAGNOSIS — R89.9 ABNORMAL LABORATORY TEST: ICD-10-CM

## 2024-05-28 PROBLEM — E66.811 CLASS 1 OBESITY DUE TO EXCESS CALORIES WITHOUT SERIOUS COMORBIDITY WITH BODY MASS INDEX (BMI) OF 33.0 TO 33.9 IN ADULT: Status: ACTIVE | Noted: 2024-05-28

## 2024-05-28 PROCEDURE — 99204 OFFICE O/P NEW MOD 45 MIN: CPT | Mod: S$GLB,,, | Performed by: STUDENT IN AN ORGANIZED HEALTH CARE EDUCATION/TRAINING PROGRAM

## 2024-05-28 PROCEDURE — 4010F ACE/ARB THERAPY RXD/TAKEN: CPT | Mod: CPTII,S$GLB,, | Performed by: STUDENT IN AN ORGANIZED HEALTH CARE EDUCATION/TRAINING PROGRAM

## 2024-05-28 PROCEDURE — 3078F DIAST BP <80 MM HG: CPT | Mod: CPTII,S$GLB,, | Performed by: STUDENT IN AN ORGANIZED HEALTH CARE EDUCATION/TRAINING PROGRAM

## 2024-05-28 PROCEDURE — 3074F SYST BP LT 130 MM HG: CPT | Mod: CPTII,S$GLB,, | Performed by: STUDENT IN AN ORGANIZED HEALTH CARE EDUCATION/TRAINING PROGRAM

## 2024-05-28 PROCEDURE — 99999 PR PBB SHADOW E&M-EST. PATIENT-LVL V: CPT | Mod: PBBFAC,,, | Performed by: STUDENT IN AN ORGANIZED HEALTH CARE EDUCATION/TRAINING PROGRAM

## 2024-05-28 PROCEDURE — 3008F BODY MASS INDEX DOCD: CPT | Mod: CPTII,S$GLB,, | Performed by: STUDENT IN AN ORGANIZED HEALTH CARE EDUCATION/TRAINING PROGRAM

## 2024-05-28 PROCEDURE — 1159F MED LIST DOCD IN RCRD: CPT | Mod: CPTII,S$GLB,, | Performed by: STUDENT IN AN ORGANIZED HEALTH CARE EDUCATION/TRAINING PROGRAM

## 2024-05-28 RX ORDER — METHYLPREDNISOLONE 4 MG/1
TABLET ORAL
Qty: 21 EACH | Refills: 0 | Status: SHIPPED | OUTPATIENT
Start: 2024-05-28 | End: 2024-06-18

## 2024-05-28 RX ORDER — HYDROXYCHLOROQUINE SULFATE 200 MG/1
400 TABLET, FILM COATED ORAL DAILY
Qty: 60 TABLET | Refills: 11 | Status: SHIPPED | OUTPATIENT
Start: 2024-05-28

## 2024-06-18 ENCOUNTER — OFFICE VISIT (OUTPATIENT)
Dept: OPTOMETRY | Facility: CLINIC | Age: 59
End: 2024-06-18
Payer: COMMERCIAL

## 2024-06-18 DIAGNOSIS — H52.203 MYOPIA WITH ASTIGMATISM AND PRESBYOPIA, BILATERAL: ICD-10-CM

## 2024-06-18 DIAGNOSIS — H10.13 ALLERGIC CONJUNCTIVITIS, BILATERAL: ICD-10-CM

## 2024-06-18 DIAGNOSIS — Z13.5 GLAUCOMA SCREENING: ICD-10-CM

## 2024-06-18 DIAGNOSIS — H25.13 AGE-RELATED NUCLEAR CATARACT, BILATERAL: Primary | ICD-10-CM

## 2024-06-18 DIAGNOSIS — M06.00 SERONEGATIVE RHEUMATOID ARTHRITIS: ICD-10-CM

## 2024-06-18 DIAGNOSIS — H52.13 MYOPIA WITH ASTIGMATISM AND PRESBYOPIA, BILATERAL: ICD-10-CM

## 2024-06-18 DIAGNOSIS — Z79.899 LONG-TERM USE OF PLAQUENIL: ICD-10-CM

## 2024-06-18 DIAGNOSIS — H52.4 MYOPIA WITH ASTIGMATISM AND PRESBYOPIA, BILATERAL: ICD-10-CM

## 2024-06-18 PROCEDURE — 4010F ACE/ARB THERAPY RXD/TAKEN: CPT | Mod: CPTII,S$GLB,,

## 2024-06-18 PROCEDURE — 92015 DETERMINE REFRACTIVE STATE: CPT | Mod: S$GLB,,,

## 2024-06-18 PROCEDURE — 1159F MED LIST DOCD IN RCRD: CPT | Mod: CPTII,S$GLB,,

## 2024-06-18 PROCEDURE — 99214 OFFICE O/P EST MOD 30 MIN: CPT | Mod: S$GLB,,,

## 2024-06-18 PROCEDURE — 99999 PR PBB SHADOW E&M-EST. PATIENT-LVL III: CPT | Mod: PBBFAC,,,

## 2024-06-18 PROCEDURE — 92134 CPTRZ OPH DX IMG PST SGM RTA: CPT | Mod: S$GLB,,,

## 2024-06-18 NOTE — PROGRESS NOTES
HPI    Pt here for annual exam-dle-6/23    Pt complains of blurred nva. Denies any flashes or floaters. Doesn't want   dfe today. Using Zaditor BID. Started taking plaquenil 1 week ago - 200mg   BID for RA.  Last edited by Supriya Hope, OD on 6/18/2024 10:17 AM.            Assessment /Plan     For exam results, see Encounter Report.    Age-related nuclear cataract, bilateral    Seronegative rheumatoid arthritis  -     Nassar Visual Field - OU - Extended - Both Eyes; Future; Expected date: 09/18/2024  -     Posterior Segment OCT Retina-Both eyes    Long-term use of Plaquenil  -     Nassar Visual Field - OU - Extended - Both Eyes; Future; Expected date: 09/18/2024  -     Posterior Segment OCT Retina-Both eyes    Glaucoma screening    Allergic conjunctivitis, bilateral    Myopia with astigmatism and presbyopia, bilateral      Very mild, not visually significant. 20/20 BCVA. Surgery not recommended at this time. Monitor yearly for changes.    2-3. Pt recently started Plaquenil for Rheumatoid Arthritis. 200mg BID - ~4.31mg/kg/day. Dilation deferred today; however, baseline Mac OCT and undilated macular exam showed no signs of bull's eye maculopathy OD, OS. Ed pt on the potential ocular side effects associated with long-term Plaquenil use. Pt to return in the coming weeks for baseline 10-2 HVF. Ed pt to RTC asap if any sudden changes in vision or other issues arise.    4. Moderate CD ratio, IOP upper teens, angles open. Positive family history. Consider baseline RNFL OCT in the future.    5. Longstanding, pt getting relief with Zaditor BID during allergy season. Monitor yearly for changes, sooner if symptoms worsen.    6. Discussed spectacle options with pt and released final spec rx. Ed pt on change in rx OS>OD and adaptation.    RTC: ~4-6 weeks for baseline 10-2 HVF, then annually

## 2024-07-26 ENCOUNTER — OCCUPATIONAL HEALTH (OUTPATIENT)
Dept: URGENT CARE | Facility: CLINIC | Age: 59
End: 2024-07-26

## 2024-07-26 DIAGNOSIS — Z13.9 ENCOUNTER FOR SCREENING: Primary | ICD-10-CM

## 2024-11-13 ENCOUNTER — OCCUPATIONAL HEALTH (OUTPATIENT)
Dept: URGENT CARE | Facility: CLINIC | Age: 59
End: 2024-11-13

## 2024-11-13 DIAGNOSIS — Z13.9 ENCOUNTER FOR SCREENING: Primary | ICD-10-CM

## 2024-11-25 ENCOUNTER — LAB VISIT (OUTPATIENT)
Dept: LAB | Facility: HOSPITAL | Age: 59
End: 2024-11-25
Attending: OTOLARYNGOLOGY
Payer: COMMERCIAL

## 2024-11-25 ENCOUNTER — OFFICE VISIT (OUTPATIENT)
Dept: OTOLARYNGOLOGY | Facility: CLINIC | Age: 59
End: 2024-11-25
Payer: COMMERCIAL

## 2024-11-25 VITALS — BODY MASS INDEX: 33.8 KG/M2 | WEIGHT: 209.44 LBS

## 2024-11-25 DIAGNOSIS — M86.8X8 POTT'S PUFFY TUMOR (FRONTAL BONE OSTEOMYELITIS WITH SUBPERIOSTEAL ABSCESS): Primary | ICD-10-CM

## 2024-11-25 DIAGNOSIS — J32.1 CHRONIC FRONTAL SINUSITIS: ICD-10-CM

## 2024-11-25 DIAGNOSIS — J32.9 RECURRENT SINUSITIS: ICD-10-CM

## 2024-11-25 PROCEDURE — 86003 ALLG SPEC IGE CRUDE XTRC EA: CPT | Mod: 59 | Performed by: OTOLARYNGOLOGY

## 2024-11-25 PROCEDURE — 99204 OFFICE O/P NEW MOD 45 MIN: CPT | Mod: 25,S$GLB,, | Performed by: OTOLARYNGOLOGY

## 2024-11-25 PROCEDURE — 86003 ALLG SPEC IGE CRUDE XTRC EA: CPT | Performed by: OTOLARYNGOLOGY

## 2024-11-25 PROCEDURE — 4010F ACE/ARB THERAPY RXD/TAKEN: CPT | Mod: CPTII,S$GLB,, | Performed by: OTOLARYNGOLOGY

## 2024-11-25 PROCEDURE — 31231 NASAL ENDOSCOPY DX: CPT | Mod: S$GLB,,, | Performed by: OTOLARYNGOLOGY

## 2024-11-25 PROCEDURE — 1160F RVW MEDS BY RX/DR IN RCRD: CPT | Mod: CPTII,S$GLB,, | Performed by: OTOLARYNGOLOGY

## 2024-11-25 PROCEDURE — 99999 PR PBB SHADOW E&M-EST. PATIENT-LVL IV: CPT | Mod: PBBFAC,,, | Performed by: OTOLARYNGOLOGY

## 2024-11-25 PROCEDURE — 3008F BODY MASS INDEX DOCD: CPT | Mod: CPTII,S$GLB,, | Performed by: OTOLARYNGOLOGY

## 2024-11-25 PROCEDURE — 1159F MED LIST DOCD IN RCRD: CPT | Mod: CPTII,S$GLB,, | Performed by: OTOLARYNGOLOGY

## 2024-11-25 PROCEDURE — 82785 ASSAY OF IGE: CPT | Performed by: OTOLARYNGOLOGY

## 2024-11-25 NOTE — Clinical Note
Kuldip Rajput, I'd like you to see this gentleman for a Pott's puffy tumor. Had a FESS and frontal dilation by another ENT that did not get better. He's ready to get it addressed whenever you were able to see him.  Appreciate you

## 2024-11-25 NOTE — PROGRESS NOTES
Subjective:       Patient ID: Lucien Linares is a 59 y.o. male.    Chief Complaint: Sinus Problem    Lucien is here for sinus/nasal complaints. Symptoms have been present for 6 months.   Symptoms began in April with sinus symptoms and focal forehead swelling. Was dx with sinusitis, Treated without multiple rounds of antibiotics and steroids which did provide some temporary relief but would always come back. He saw another [place] who recommended surgery due to the degree of obstruction. Had sinus surgery Sept 18 including a unilateral FESS with frontal dilation. Did not improve. Has been on 3 rounds of antibiotics for 10 days following surgery. He has been using saline with Budesonide rinses for months. Had planned on an in office procedure again but during that procedure, it was aborted secondary to a syncopal episode. Here for second opinion.    No headaches or vision changes. He gets an occasional throb. There is a new area of fluctuance above the previous area of swelling (which has resolved.)    Symptoms are all on the left. Denies right sided symptoms     Allergy testing: as a kid - he does get some rhinitis issues.   History of asthma: no    SNOT-22 score: : (Patient-Rptd) (P) 16    NOSE score:: (Patient-Rptd) (P) 20    Social History     Tobacco Use   Smoking Status Never    Passive exposure: Never   Smokeless Tobacco Never     Social History     Substance and Sexual Activity   Alcohol Use Yes    Alcohol/week: 0.8 standard drinks of alcohol    Types: 1 Standard drinks or equivalent per week    Comment: 1-2 / week        Objective:        Constitutional:   Vital signs are normal. He appears well-developed and well-nourished.     Head:  Normocephalic and atraumatic.       1 cm area of fluctuant, mild tenderness, no erythema (corresponding to superior aspect of frontal sinus)     Ears:  Hearing normal to normal and whispered voice; external ear normal without scars, lesions, or masses; ear canal, tympanic  membrane, and middle ear normal..     Nose:  Nose normal including turbinates, nasal mucosa, sinuses and nasal septum.   Nasal endoscopy indicated due to degree of symptoms    Mouth/Throat  Oropharynx clear and moist without lesions or asymmetry.     Neck:  Neck normal without thyromegaly masses, asymmetry, normal tracheal structure, crepitus, and tenderness.         Tests / Results:  CT head 11/2024 personally reviewed:  Bony defect of inferior and superior aspect of anterior table. Completely opacified frontal with possible mixed hypodensity which appears to be extending outside the inferior defect.  Has obstruction of left frontal and ethmoids  Possible residual small portion of uncinate on left.   Changes following a left sided FESS.  Fluid in Right maxillary with possible obstruction.      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     Procedure: Risks, benefits, and alternatives of the procedure were discussed with the patient, and consent was obtained to perform a nasal endoscopy.  The nasal cavity was sprayed with a topical decongestant and topical anesthetic. After adequate anesthesia was obtained, the scope was passed into each nostril independently.  The nasal cavities (including inferior turbinates, middle turbinates, inferior meatus, middle meatus, superior meatus) nasopharynx, choana, eustachian tube, fossa of Rosenmüller, and adenoids were examined. All findings were normal with exception of description below. At the end of the examination, the scope was removed. The patient tolerated the procedure well with no complications.     LEFT: Purulence in nasal  cavity. Partially resected MT. Inflammatory polypoid changes involving ethmoid. Completely obstructed frontoethmoidal recess No discernable outflow tract. Mucous around natural max os  RIGHT: normal (MM normal, no pus or edema)    Assessment:       1. 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 Recurrent Sinus Infections    3. Chronic frontal sinusitis          Plan:       Patient would like some basic RAST testing - ordered  We reviewed the scan and endoscopy.  Given his bony erosion of the anterior table and new area of erosion based on his report, we discussed the need for a more extensive frontal procedure, at least endoscopic with small possibility of combined approach. We discussed repeat CT Sinus and poss need for MRI depending on and any pre-op concerns for a neoplasm by Dr. Barker.  Will arrange for a visit in the near future.

## 2024-11-26 ENCOUNTER — TELEPHONE (OUTPATIENT)
Dept: OTOLARYNGOLOGY | Facility: CLINIC | Age: 59
End: 2024-11-26
Payer: COMMERCIAL

## 2024-11-26 LAB — IGE SERPL-ACNC: 60 IU/ML (ref 0–100)

## 2024-11-26 NOTE — TELEPHONE ENCOUNTER
----- Message from Jayla sent at 11/26/2024 10:11 AM CST -----   Type:  Needs Medical Advice    Who Called: PT    Would the patient rather a call back or a response via [facility]? Call  Best Call Back Number: [phone]        Additional Information: pt states having insurance trouble for lab appt need someone   Please call back to advise. Thank you!

## 2024-11-27 ENCOUNTER — HOSPITAL ENCOUNTER (OUTPATIENT)
Dept: RADIOLOGY | Facility: HOSPITAL | Age: 59
Discharge: HOME | End: 2024-11-27
Attending: OTOLARYNGOLOGY
Payer: COMMERCIAL

## 2024-11-27 DIAGNOSIS — J32.9 RECURRENT SINUSITIS: ICD-10-CM

## 2024-11-27 DIAGNOSIS — M86.8X8 POTT'S PUFFY TUMOR (FRONTAL BONE OSTEOMYELITIS WITH SUBPERIOSTEAL ABSCESS): ICD-10-CM

## 2024-11-27 LAB
AMER SYCAMORE IGE QN: <0.1 KU/L
FEATHER PANEL #2: <0.1 KU/L

## 2024-11-27 PROCEDURE — 70487 CT MAXILLOFACIAL W/DYE: CPT | Mod: 26,,, | Performed by: RADIOLOGY

## 2024-11-27 PROCEDURE — 70487 CT MAXILLOFACIAL W/DYE: CPT | Mod: TC,PO

## 2024-11-27 PROCEDURE — 25500020 PHARM REV CODE 255: Mod: PO | Performed by: OTOLARYNGOLOGY

## 2024-11-27 RX ADMIN — IOHEXOL 100 ML: 350 INJECTION, SOLUTION INTRAVENOUS at 12:11

## 2024-11-29 ENCOUNTER — OFFICE VISIT (OUTPATIENT)
Dept: OTOLARYNGOLOGY | Facility: CLINIC | Age: 59
End: 2024-11-29
Payer: COMMERCIAL

## 2024-11-29 ENCOUNTER — TELEPHONE (OUTPATIENT)
Dept: OTOLARYNGOLOGY | Facility: CLINIC | Age: 59
End: 2024-11-29
Payer: COMMERCIAL

## 2024-11-29 ENCOUNTER — TELEPHONE (OUTPATIENT)
Dept: OTOLARYNGOLOGY | Facility: CLINIC | Age: 59
End: 2024-11-29

## 2024-11-29 VITALS
BODY MASS INDEX: 33.63 KG/M2 | WEIGHT: 208.31 LBS | DIASTOLIC BLOOD PRESSURE: 85 MMHG | HEART RATE: 99 BPM | SYSTOLIC BLOOD PRESSURE: 145 MMHG

## 2024-11-29 DIAGNOSIS — J32.9 RECURRENT SINUSITIS: ICD-10-CM

## 2024-11-29 DIAGNOSIS — J32.1 CHRONIC FRONTAL SINUSITIS: Primary | ICD-10-CM

## 2024-11-29 LAB
A ALTERNATA IGE QN: <0.1 KU/L
A FUMIGATUS IGE QN: <0.1 KU/L
ALLERGEN BOXELDER MAPLE TREE IGE: <0.1 KU/L
ALLERGEN MULBERRY TREE IGE: <0.1 KU/L
ALLERGEN PIGWEED IGE: <0.1 KU/L
ALLERGEN WHITE ASH TREE IGE: <0.1 KU/L
BALD CYPRESS IGE QN: <0.1 KU/L
BERMUDA GRASS IGE QN: <0.1 KU/L
C HERBARUM IGE QN: <0.1 KU/L
CAT DANDER IGE QN: <0.1 KU/L
CEDAR IGE QN: <0.1 KU/L
COCKLEBUR IGE QN: <0.1 KU/L
COMMON RAGWEED IGE QN: <0.1 KU/L
D FARINAE IGE QN: <0.1 KU/L
D PTERONYSS IGE QN: <0.1 KU/L
DEPRECATED CEDAR IGE RAST QL: NORMAL
DEPRECATED M RACEMOSUS IGE RAST QL: NORMAL
DEPRECATED TIMOTHY IGE RAST QL: NORMAL
DOG DANDER IGE QN: <0.1 KU/L
ELDER IGE QN: <0.1 KU/L
ELM CEDAR, IGE: <0.1 KU/L
ENGL PLANTAIN IGE QN: <0.1 KU/L
GOOSEFOOT IGE QN: <0.1 KU/L
JOHNSON GRASS IGE QN: <0.1 KU/L
KENT BLUE GRASS IGE QN: <0.1 KU/L
M RACEMOSUS IGE QN: <0.1 KU/L
MUGWORT IGE QN: <0.1 KU/L
NETTLE IGE QN: <0.1 KU/L
P NOTATUM IGE QN: <0.1 KU/L
PECAN/HICK TREE IGE QN: <0.1 KU/L
RAST CLASS: NORMAL
SHEEP SORREL IGE QN: <0.1 KU/L
SILVER BIRCH IGE QN: <0.1 KU/L
TIMOTHY IGE QN: <0.1 KU/L
WHITE OAK IGE QN: <0.1 KU/L

## 2024-11-29 PROCEDURE — 87075 CULTR BACTERIA EXCEPT BLOOD: CPT | Performed by: STUDENT IN AN ORGANIZED HEALTH CARE EDUCATION/TRAINING PROGRAM

## 2024-11-29 PROCEDURE — 99999 PR PBB SHADOW E&M-EST. PATIENT-LVL IV: CPT | Mod: PBBFAC,,, | Performed by: STUDENT IN AN ORGANIZED HEALTH CARE EDUCATION/TRAINING PROGRAM

## 2024-11-29 PROCEDURE — 87070 CULTURE OTHR SPECIMN AEROBIC: CPT | Performed by: STUDENT IN AN ORGANIZED HEALTH CARE EDUCATION/TRAINING PROGRAM

## 2024-11-29 NOTE — TELEPHONE ENCOUNTER
----- Message from Stevenson Gould MD sent at 11/29/2024  2:10 PM CST -----  Allergy testing negative.

## 2024-11-29 NOTE — H&P (VIEW-ONLY)
Otolaryngology - Head and Neck Surgery New Patient Visit    11/29/2024    Referring Provider: No ref. provider found    Chief Complaint   Patient presents with    FESS and frontal dilation       History of Present Illness, Otolaryngology Specialty-Specific Exam, and Assessment and Plan:     Lucien Linares is a 59 y.o. male who presents for evaluation of chronic sinus infections, which was 1st noted February of this year.  He reports waking up 1 day with significant swelling of the frontal forehead.  He was seen by urgent care in his PCP who treated with antibiotics.  Reports the swelling did not significantly improve.  He was seen by an ENT where a endoscopic sinus surgery was performed.  Reports soon after finishing the surgery swelling reappeared.  He was scheduled for a in office balloon dilation of the frontal sinus over this was aborted due to the patient's having a syncopal episode requiring him to be seen in the ER.  He complains of recurrent sinus infections and swelling of the mostly left-sided forehead. He denies purulent nasal drainage, epistaxis, vision changes or anosmia. He has been treated with intranasal Flonase and multiple antibiotics in the past. He has had allergy testing done in the past which was negative. He denies previous skullbase surgery.    SNOT-22 score: : (Patient-Rptd) (P) 14    He had a CT of the sinuses done on 11/25/24 which I reviewed along with the associated radiology report.    On exam today, the ears are normal. The oral cavity is clear. The neck is clear. The nasopharynx, hypopharynx, and larynx are normal. Nasal endoscopy reveals postsurgical changes of the left sinonasal cavity with partial uncinectomy, maxillary antrostomy and partial anterior ethmoidectomy.  Stenosis of the left frontal recess with purulent drainage emanating from the middle meatus.  Copious purulent drainage was noted from the right middle meatus.  There was no nasal masses or nasal polyposis  appreciated.  Nasopharynx has purulent drainage within, no nasopharynx mass.  Eustachian tubes within normal limits.      Impression today is chronic frontal sinusitis with erosive changes of the anterior table.  He has likely been having recurrent frontal sinusitis with frontal osteomyelitis.     He would benefit from revSTESS & FDO for the treatment of his condition.  I discussed the risks, benefits and alternatives to surgery with the patient, as well as the expected postoperative course.  I gave him the opportunity to ask questions and I answered all of them.  I provided relevant printed information on his condition for him to review at home.  Same-day discharge is anticipated.  He may have anesthesia triage by telephone.   The surgery will be tentatively scheduled for 12/19/24.  He will return for a postoperative visit 1 week after surgery.    The risks and benefits of endoscopic surgery were discussed with the patient in detail, which include but are not limited to pain, bleeding, infection, the need for reoperation, injury to orbit or orbital contents, injury to brain or meninges, and unforeseeable complications of surgery including myocardial infarction, stroke or pulmonary embolus.    Cultures were obtained from the left frontal recess to assist with antibiotic therapy prior to surgery. Cultures of the sinuses were obtained today, if the antibiotics need to be changed based on the sensitivities I will adjust them as needed.  I will wait to prescribe an antibiotic until cultures have resulted.     Thank you for allowing us to participate in the care of your patient. We will continue to keep you informed of his progress.    Sincerely yours,    Regulo Barker MD      Objective     Physical Examination  Vitals -  weight is 94.5 kg (208 lb 5.4 oz). His blood pressure is 145/85 (abnormal) and his pulse is 99.   Constitutional - General appearance: Normal. Ability to communicate: Normal.  Head & Face - Overall  appearance, scars, masses: Normal. Palpation &/or percussion of face: Normal, no obvious bony step-offs or defects noted of the anterior table.  No fluctuance appreciated. Salivary glands: Normal. Facial strength: Normal  ENMT - Otoscopic exam: Normal. Assessment of hearing: Normal. External inspection: Normal. Nasal mucosa, septum, turbinates: Abnormal see exam details. Lips, teeth, gums: Normal. Oropharynx: Normal. Pharyngeal walls/pyriform sinus: Normal. Larynx: Normal. Nasopharynx: Normal  Neck - Neck: Normal. Thyroid: Normal  Lymphatic - Palpation of lymph nodes: Normal  Eyes - Ocular mobility: Normal  Respiratory - Inspection of Chest: Normal  Cardiovascular - Peripheral vascular system: Normal  Neurological/Psychiatric - Orientation: Normal    Review of Systems  Review of Systems   Constitutional:  Positive for malaise/fatigue.   Eyes: Negative.    Respiratory: Negative.     Cardiovascular: Negative.    Gastrointestinal: Negative.    Genitourinary: Negative.    Musculoskeletal: Negative.    Skin: Negative.    Neurological: Negative.    Psychiatric/Behavioral: Negative.                                  A complete review of systems was obtained 11/29/2024 and reviewed.  The review of systems is negative for symptoms except as described above.    BP (!) 145/85 (BP Location: Left arm)   Pulse 99   Wt 94.5 kg (208 lb 5.4 oz)   BMI 33.63 kg/m²      Nasal Endoscopy:  11/29/2024    The use of diagnostic nasal endoscopy was considered medically necessary for the evaluation and visualization of the nasal anatomy for symptoms suggestive of nasal or sinus origin. Physical examination (including a nasal speculum evaluation) did not provide sufficient clinical information to establish a diagnosis, or symptoms did not improve or worsened following treatment.     The nasal cavity was decongested with topical 1% phenylephrine and anesthetized with 4% lidocaine.  A rigid 0-degree endoscope was introduced into the nasal  cavity.    The patient was seated in the examination chair. After discussion of risks and benefits, a nasal endoscope was inserted into the nose the endoscope was passed along the left nasal floor to the nasopharynx. It was then passed between the middle and superior meatus, nasal turbinates, nasal septum, nasopharynx and sphenoethmoid region. The nasal endoscope was withdrawn and there was no complications. An identical procedure was performed on the right side. I was present for the entire procedure.The patient tolerated the above procedure well. The findings of this procedure can be found in the dictated note from 11/29/2024 visit.                                Data Reviewed    WBC (K/uL)   Date Value   11/13/2024 18.48 (H)     Eosinophil % (%)   Date Value   11/13/2024 1.2     Eos # (K/uL)   Date Value   11/13/2024 0.2     Platelets (K/uL)   Date Value   11/13/2024 367     Glucose (mg/dL)   Date Value   11/13/2024 116 (H)     Total IgE (IU/mL)   Date Value   11/25/2024 60       I independently reviewed the images of the CT sinuses dated 11/25/24. Pertinent findings include Midline septum without significant deviation.  Previous evidence of left maxillary antrostomy and partial ethmoidectomy.  Mucosal thickening involving the bilateral bilateral maxillary sinuses with air-fluid levels.  Complete opacification of the left residual anterior ethmoids + complete opacification of the left frontal sinus with bony dehiscence of the anterior table..

## 2024-11-30 LAB — BACTERIA SPEC AEROBE CULT: NO GROWTH

## 2024-12-03 ENCOUNTER — PATIENT MESSAGE (OUTPATIENT)
Dept: OTOLARYNGOLOGY | Facility: CLINIC | Age: 59
End: 2024-12-03
Payer: COMMERCIAL

## 2024-12-06 NOTE — ANESTHESIA PAT ROS NOTE
12/06/2024  Lucien Linares is a 59 y.o., male.      Pre-op Assessment          Review of Systems  Anesthesia Hx:   History of prior surgery of interest to airway management or planning:  Previous anesthesia: General CHOLECYSTECTOMY, LAPAROSCOPIC (Abdomen) 9/12/18 with general anesthesia.  Procedure performed at an [facility].      Airway issues documented on chart review include mask, easy, videolaryngoscope used  , view on video-laryngoscopy Grade I       EENT/Dental:   CHRONIC FRONTAL SINUSITIS. RECURRENT SINUSITIS.           Cardiovascular:     Hypertension           hyperlipidemia                               Musculoskeletal:  Arthritis               Endocrine:        Obesity / BMI > 30  Psych:  Psychiatric History                       Anesthesia Assessment: Preoperative EQUATION    Planned Procedure: Procedure(s) (LRB):  FESS, USING COMPUTER-ASSISTED NAVIGATION- Melior Discovery (Bilateral)  ENDOSCOPY, NOSE OR PARANASAL SINUS, WITH MAXILLARY ANTROSTOMY (Bilateral)  SEPTOPLASTY, NOSE, WITH NASAL TURBINATE REDUCTION (Bilateral)  Requested Anesthesia Type:General  Surgeon: Regulo Barker MD  Service: ENT  Known or anticipated Date of Surgery:12/19/2024    Surgeon notes: reviewed    Electronic QUestionnaire Assessment completed via nurse interview with patient.        Triage considerations:     The patient has no apparent active cardiac condition (No unstable coronary Syndrome such as severe unstable angina or recent [<1 month] myocardial infarction, decompensated CHF, severe valvular   disease or significant arrhythmia)    Previous anesthesia records:GETA and No problems    Airway/Jaw/Neck:  Airway Findings: Mouth Opening: Normal Tongue: Normal  General Airway Assessment: Adult, Average  Mallampati: II  Jaw/Neck Findings:  Neck ROM: Normal ROM      Airway Present Prior to Hospital Arrival?: No  Placement Date: 09/12/18 Placement Time: 0708 Method of Intubation: Glidescope Inserted by: CRNA Airway Device: Endotracheal Tube Mask Ventilation: Easy - oral Intubated: Postinduction Blade: Guerrero #3 Airway Device Size: 8.0 Style: Cuffed Cuff Inflation: Minimal occlusive pressure Inflation Amount (mL): 6 Placement Verified By: Capnometry;Auscultation;ETT Condensation Grade: Grade I Complicating Factors: Anterior larynx;Narrow palate , DL times 2 for no view of cords. Grade I view with glidescope. Findings Post-Intubation: Positive EtCO2;Bilateral breath sounds;Atraumatic/Condition of teeth unchanged Depth of Insertion (cm): 22 Secured at: Lips Complications: None Breath Sounds: Equal Bilateral Insertion attempts (enter comment if more than 2 attempts): 2       Last PCP note: 3-6 months ago , within [facility]   Subspecialty notes: ENT, Rheumatology    Other important co-morbidities: HLD, HTN, and Obesity      Tests already available:  Available tests,  within 3 months , within [facility] .     11/27/24  CT SINUSES    11/13/24  MAGNESIUM, CBC, CMP, NR PRO NATRIURETIC PEPTIDE, TROPONIN, CT HEAD, CT CERVICAL SPINE, EKG            Instructions given. (See in Nurse's note)    Optimization:  Anesthesia Preop Clinic Assessment  NOT Indicated    Medical Opinion NOT Indicated             Plan:    Testing:  PT/INR and PTT        Patient  has previously scheduled Medical Appointment: NOT AT THIS TIME    Navigation: Tests Scheduled.                           Results will be tracked by Preop Clinic.

## 2024-12-11 ENCOUNTER — TELEPHONE (OUTPATIENT)
Dept: INFECTIOUS DISEASES | Facility: CLINIC | Age: 59
End: 2024-12-11
Payer: COMMERCIAL

## 2024-12-11 ENCOUNTER — PATIENT MESSAGE (OUTPATIENT)
Dept: INFECTIOUS DISEASES | Facility: CLINIC | Age: 59
End: 2024-12-11
Payer: COMMERCIAL

## 2024-12-11 NOTE — TELEPHONE ENCOUNTER
Outpatient Antibiotic Therapy Plan:    Referral to [facility].  Referral for Home Health services.    1) Infection: J32.1 (ICD-10-CM) - Chronic frontal sinusitis     2) Antibiotics:    Intravenous antibiotics:  Meropenem 2g IV q 8 hours          3) Therapy Duration:  6 weeks    Estimated end date of IV antibiotics: 01/30/2025    4) Outpatient Weekly Labs:    Order the following labs to be drawn on Mondays:   CBC w/ Diff  CMP   CRP        5) Fax Lab Results to Infectious Diseases Provider: Dr. Castano    [facility] Fax Number: [phone]    6) Ordering Information:    Orders Mode: Verbal with readback  Ordering Provider: Dr. Castano

## 2024-12-11 NOTE — TELEPHONE ENCOUNTER
----- Message from Med Assistant Leroy sent at 12/11/2024 12:01 PM CST -----  Regarding: FW: Returning Missed Call  Contact: [phone]    ----- Message -----  From: Nicolle Newell  Sent: 12/11/2024  11:52 AM CST  To: #  Subject: Returning Missed Call                            Returning a Missed Call    Caller: Patient     Returning call to: CANDACE TAFOYA     Caller can be reached @: [phone]

## 2024-12-16 ENCOUNTER — OFFICE VISIT (OUTPATIENT)
Dept: INFECTIOUS DISEASES | Facility: CLINIC | Age: 59
End: 2024-12-16
Payer: COMMERCIAL

## 2024-12-16 VITALS
HEIGHT: 66 IN | SYSTOLIC BLOOD PRESSURE: 155 MMHG | BODY MASS INDEX: 33.24 KG/M2 | HEART RATE: 72 BPM | DIASTOLIC BLOOD PRESSURE: 98 MMHG | WEIGHT: 206.81 LBS | TEMPERATURE: 98 F

## 2024-12-16 DIAGNOSIS — M86.8X8: Primary | ICD-10-CM

## 2024-12-16 PROCEDURE — 3077F SYST BP >= 140 MM HG: CPT | Mod: CPTII,S$GLB,, | Performed by: INTERNAL MEDICINE

## 2024-12-16 PROCEDURE — 1159F MED LIST DOCD IN RCRD: CPT | Mod: CPTII,S$GLB,, | Performed by: INTERNAL MEDICINE

## 2024-12-16 PROCEDURE — 99999 PR PBB SHADOW E&M-EST. PATIENT-LVL IV: CPT | Mod: PBBFAC,,, | Performed by: INTERNAL MEDICINE

## 2024-12-16 PROCEDURE — 99204 OFFICE O/P NEW MOD 45 MIN: CPT | Mod: S$GLB,,, | Performed by: INTERNAL MEDICINE

## 2024-12-16 PROCEDURE — 3080F DIAST BP >= 90 MM HG: CPT | Mod: CPTII,S$GLB,, | Performed by: INTERNAL MEDICINE

## 2024-12-16 PROCEDURE — 3008F BODY MASS INDEX DOCD: CPT | Mod: CPTII,S$GLB,, | Performed by: INTERNAL MEDICINE

## 2024-12-16 PROCEDURE — 4010F ACE/ARB THERAPY RXD/TAKEN: CPT | Mod: CPTII,S$GLB,, | Performed by: INTERNAL MEDICINE

## 2024-12-16 RX ORDER — ONDANSETRON 8 MG/1
TABLET, ORALLY DISINTEGRATING ORAL
Status: ON HOLD | COMMUNITY
Start: 2024-09-17 | End: 2024-12-19 | Stop reason: HOSPADM

## 2024-12-16 RX ORDER — DOXYCYCLINE 100 MG/1
100 CAPSULE ORAL 2 TIMES DAILY
Status: ON HOLD | COMMUNITY
Start: 2024-07-23 | End: 2024-12-19

## 2024-12-16 RX ORDER — OXYCODONE AND ACETAMINOPHEN 7.5; 325 MG/1; MG/1
1 TABLET ORAL EVERY 8 HOURS PRN
Status: ON HOLD | COMMUNITY
Start: 2024-09-17 | End: 2024-12-19 | Stop reason: HOSPADM

## 2024-12-16 RX ORDER — BUDESONIDE 0.5 MG/2ML
INHALANT ORAL
COMMUNITY
Start: 2024-10-28

## 2024-12-16 RX ORDER — MELOXICAM 7.5 MG/1
TABLET ORAL
COMMUNITY

## 2024-12-16 RX ORDER — LOSARTAN POTASSIUM AND HYDROCHLOROTHIAZIDE 12.5; 1 MG/1; MG/1
TABLET ORAL
Status: ON HOLD | COMMUNITY
End: 2024-12-19

## 2024-12-16 RX ORDER — LEVOFLOXACIN 750 MG/1
TABLET ORAL
Status: ON HOLD | COMMUNITY
Start: 2024-10-28 | End: 2024-12-19

## 2024-12-16 RX ORDER — SULFAMETHOXAZOLE AND TRIMETHOPRIM 800; 160 MG/1; MG/1
1 TABLET ORAL 2 TIMES DAILY
Status: ON HOLD | COMMUNITY
Start: 2024-08-07 | End: 2024-12-19

## 2024-12-16 RX ORDER — MONTELUKAST SODIUM 10 MG/1
10 TABLET ORAL NIGHTLY
COMMUNITY
Start: 2024-11-19

## 2024-12-16 NOTE — PROGRESS NOTES
Subjective:     Patient ID: Lucien Linares is a 59 y.o. male    Chief Complaint: Sinusitis    HPI: 59M who is referred to ID for chronic sinus infections and swelling of L side of his forehead. He does not have significant congestion, drainage, pain or headaches. He notes history of prior sinus surgery. Recent imaging with complete opacification of L frontal and anterior ethmoid air cells w/ bony erosion through L frontal sinus, and additional scattered sinus disease in paranasal and R maxillary sinuses. ENT is planning on FESS on 12/19. He has been referred to ID for antibiotic management. Pt is not currently on antibiotics. Recent nares cx from 11/29 positive only for prevotella.       Immunization History   Administered Date(s) Administered    COVID-19 MRNA, LN-S PF (MODERNA HALF 0.25 ML DOSE) 12/13/2021    COVID-19, MRNA, LN-S, PF (MODERNA FULL 0.5 ML DOSE) 02/22/2021, 03/23/2021    Influenza 08/27/2022    Influenza - Quadrivalent - MDCK - PF 10/06/2017, 08/20/2022, 11/01/2023    Influenza - Quadrivalent - PF *Preferred* (6 months and older) 11/23/2015, 09/22/2016, 10/06/2017, 10/23/2018, 09/26/2021    Influenza - Trivalent - Afluria, Fluzone MDV 11/23/2015    Pneumococcal Conjugate - 13 Valent 07/13/2017    Pneumococcal Conjugate - 20 Valent 07/22/2022    Zoster Recombinant 06/02/2021, 08/02/2021        Review of Systems   All other systems reviewed and are negative.       Past Medical History:   Diagnosis Date    a Chronic Sinus Tachycardia 11/24/2023    Decreased His Testosterone Dose To See If This Resolves, Or May Need To Change Concerta To Focalin    a Family H/O Early CAD     6/2/21 RXd ASA 81 Mg Daily; 7/29/22 Cardiac CT Ca+ Score = 42.0 (See Report); His Brother Had An MI At Age 54    b Hypertension     10/19/17 RXd Guanfacine 1 Mg qAM And Decreased Losartan/HCTZ 100/25 Mg qAM To 1/2 Tab qAM; 5/5/17 Increased Losartan/HCTZ To 100/25 Mg qAM; 4/21/17 RXd Losartan/HCTZ 50/12.5 Mg qAM     c  "Hypercholesterolemia     His Goal LDL Is < 100 (Cardiac CT Ca+ Score); 8/5/22 RXd Crestor 10 Mg qHS With Labs In 6 Weeks    j Chronic Mildly Elevated ALT Levels     Am Monitoring    j Descending And Sigmoid Diverticulosis     Dr. Logan sanders GERD (gastroesophageal reflux disease)     j H/O Laparoscopic Cholecystectomy In 09/2018     Dr. Yuval sanders Irritable Bowel Syndrome     Dr. Logan Tsai Noted This During Patient's 6/29/16 TC: "Mild Colonic Spasm C/W IBS"    j Small Internal Hemorrhoids     Dr. Logan Tsai    k H/O Right Inguinal Hernia Repair In 2006     k Low Testosterone Levels ####    11/21/18 Decreased Testosterone To 100 Mg IM Every 2 Weeks; Dr. Valentín Augustin; On IM Testosterone Monthly; 10/7/16 Testosterone = 168 (195.0-1138.0)    l Bilateral Hand Arthritis     11/24/23 Referred To Dr. Israel Dominguez; 11/24/23 Rheumatologic Labs = All Normal    l Chronic Left Shoulder Pain     7/13/17 Referred To Dr. Bethel Ornelas    l H/O Right Shoulder SX In 2011     Dr. Barragan At [facility]    n Attention Deficit Disorder     11/24/23 His Is Now Doing Well On No Treatment For This; 7/22/22 RXd Vyvanse 30 Mg qAM With Toprol-XL 12.5-25 Mg Daily; 10/23/18 RXd Concerta 18 Mg qAM; 10/19/17 D/Cd Vyvanse And RXd Guanfacine 1 Mg Daily (#90;Rx3); Wellbutrin- Mg Was Ineffective; Adderal-XR 20 Mg Increased His BP And HR; Vyvanse 50 Mg qAM Caused Increased BP; This DX Is Well Documented In His [facility] EMR    n Depression     This Resolved On Concerta Alone; 10/23/18 RXd Concerta 18 Mg qAM, And Will See If This Is Benifitted By It    n Therapeutic Drug Monitoring     o Left Ear Basal Cell Carcinoma     Dr. Mcelroy (In [place]) Resected This In 09/2022 And Also Started Efudex Cream Topically    o Recurrent Sinus Infections 07/26/2024    Dr. Cristian Sinclair (ENT); 7/17/24 Maxillofacial Sinus CT Scan W/O Contrast = (Report Scanned)    Wellness Visit 11/24/2023      Past Surgical History:   Procedure Laterality " Date    COLONOSCOPY N/A 6/29/2016    Procedure: COLONOSCOPY;  Surgeon: Logan Tsai Jr., MD;  Location: [facility] ENDO;  Service: Endoscopy;  Laterality: N/A;    HERNIA REPAIR Right 2006    groin    LAPAROSCOPIC CHOLECYSTECTOMY N/A 9/12/2018    Procedure: CHOLECYSTECTOMY, LAPAROSCOPIC;  Surgeon: Yuval Cadena MD;  Location: [facility] OR;  Service: General;  Laterality: N/A;    SHOULDER SURGERY  2011    Right    TONSILLECTOMY       Family History   Problem Relation Name Age of Onset    Sleep apnea Brother      Heart disease Father  47    Hypertension Father      Cancer Mother          BREAST    Cataracts Paternal Grandmother      Glaucoma Paternal Grandmother      Glaucoma Paternal Grandfather      Amblyopia Neg Hx      Blindness Neg Hx      Diabetes Neg Hx      Macular degeneration Neg Hx      Retinal detachment Neg Hx      Strabismus Neg Hx      Stroke Neg Hx      Thyroid disease Neg Hx       Social History     Tobacco Use    Smoking status: Never     Passive exposure: Never    Smokeless tobacco: Never   Substance Use Topics    Alcohol use: Yes     Alcohol/week: 0.8 standard drinks of alcohol     Types: 1 Standard drinks or equivalent per week     Comment: 1-2 / week    Drug use: No       Objective:     Physical Exam  Constitutional:       General: He is not in acute distress.     Appearance: Normal appearance. He is well-developed. He is not ill-appearing or diaphoretic.   HENT:      Head: Normocephalic and atraumatic.      Right Ear: External ear normal.      Left Ear: External ear normal.      Nose: Nose normal.   Eyes:      General: No scleral icterus.        Right eye: No discharge.         Left eye: No discharge.      Extraocular Movements: Extraocular movements intact.      Conjunctiva/sclera: Conjunctivae normal.   Pulmonary:      Effort: Pulmonary effort is normal. No respiratory distress.      Breath sounds: No stridor.   Skin:     General: Skin is dry.      Coloration: Skin is not jaundiced or pale.       Findings: No erythema.   Neurological:      General: No focal deficit present.      Mental Status: He is alert and oriented to person, place, and time. Mental status is at baseline.   Psychiatric:         Mood and Affect: Mood normal.         Behavior: Behavior normal.         Thought Content: Thought content normal.         Judgment: Judgment normal.         Data:    All data, including recent labs, radiology, and pathology, has been independently reviewed.    Labs:    Recent Labs   Lab Result Units 11/13/24  1810   WBC K/uL 18.48*   Hemoglobin g/dL 15.5   Hematocrit % 46.0   Sodium mmol/L 134*   Potassium mmol/L 4.0   Chloride mmol/L 97   BUN mg/dL 20   Creatinine mg/dL 1.13   AST U/L 40   ALT U/L 41   Alkaline Phosphatase U/L 87   Total Bilirubin mg/dL 0.4        Radiology:    No results found in the last 24 hours.     Assessment:    1. Frontal bone osteomyelitis with subperiosteal abscess  Plans for FESS on 12/19, cultures will be obtained  Will place PICC line, first dose of meropenem 2g Q8H on 12/20. Will plan for at least 2 weeks IV abx w/ possible transition to oral abx if able to complete minimum of 6 weeks of therapy             Follow up in 2 weeks    The total time for evaluation and management services performed on 12/16/24 was greater than 45 minutes.     Karen Castano DO  Infectious Disease

## 2024-12-18 ENCOUNTER — ANESTHESIA EVENT (OUTPATIENT)
Dept: SURGERY | Facility: HOSPITAL | Age: 59
End: 2024-12-18
Payer: COMMERCIAL

## 2024-12-18 ENCOUNTER — PATIENT MESSAGE (OUTPATIENT)
Dept: OTOLARYNGOLOGY | Facility: CLINIC | Age: 59
End: 2024-12-18
Payer: COMMERCIAL

## 2024-12-18 ENCOUNTER — HOSPITAL ENCOUNTER (OUTPATIENT)
Dept: RADIOLOGY | Facility: HOSPITAL | Age: 59
Discharge: HOME | End: 2024-12-18
Attending: INTERNAL MEDICINE
Payer: COMMERCIAL

## 2024-12-18 DIAGNOSIS — J32.9 RECURRENT SINUSITIS: ICD-10-CM

## 2024-12-18 PROCEDURE — 71045 X-RAY EXAM CHEST 1 VIEW: CPT | Mod: 26,$0,, | Performed by: RADIOLOGY

## 2024-12-18 NOTE — ANESTHESIA PREPROCEDURE EVALUATION
[facility]  Anesthesia Pre-Operative Evaluation   12/18/2024        Lucien Linares, 1965  [id number]  Procedure(s) (LRB):  FESS, USING COMPUTER-ASSISTED NAVIGATION- [employer] (Bilateral)  ENDOSCOPY, NOSE OR PARANASAL SINUS, WITH MAXILLARY ANTROSTOMY (Bilateral)  SEPTOPLASTY, NOSE, WITH NASAL TURBINATE REDUCTION (Bilateral)    Subjective    Lucien Linares is a 59 y.o. male w/ a significant PMHx of HTN, hypercholesterolemia, seronegative rheumatoid arthritis, depression, and recurrent sinus infections.    Patient now presents for above procedure(s).     Prev Airway: None documented.    LDA: None documented.       Drips: None documented.      Patient Active Problem List   Diagnosis    Attention Deficit Disorder    Low Testosterone Levels    Descending And Sigmoid Diverticulosis    Irritable Bowel Syndrome    Small Internal Hemorrhoids    H/O Right Inguinal Hernia Repair In 2006    Family H/O CAD    Hypertension    Chronic Left Shoulder Pain    H/O Laparoscopic Cholecystectomy In 09/2018    Depression    Chronic Mildly Elevated ALT Levels    Therapeutic Drug Monitoring    Hypercholesterolemia    Left Ear Basal Cell Carcinoma    Arthritis of both hands    Sinus tachycardia    Elevated C-reactive protein (CRP)    Seronegative rheumatoid arthritis    Class 1 obesity due to excess calories without serious comorbidity with body mass index (BMI) of 33.0 to 33.9 in adult    Recurrent Sinus Infections       Review of patient's allergies indicates:   Allergen Reactions    Codeine Nausea Only    Penicillins Hives and Rash    Adderall [dextroamphetamine-amphetamine]      Elevated blood pressure.  Elevated heart rate.    Vyvanse [lisdexamfetamine]      Elevated Blood Pressure       Current Inpatient Medications:       No current facility-administered medications on file prior to encounter.     Current Outpatient Medications on File Prior to Encounter   Medication Sig Dispense Refill    acetaminophen  (TYLENOL) 650 MG TbSR Take 1 tablet (650 mg total) by mouth 2 (two) times daily as needed (for mild to moderate pain).  0    aspirin (ECOTRIN) 81 MG EC tablet Take 1 tablet (81 mg total) by mouth once daily.  0    cetirizine (ZYRTEC) 10 MG tablet Take 1 tablet (10 mg total) by mouth every evening. 90 tablet 3    ergocalciferol, vitamin D2, (VITAMIN D ORAL) Take by mouth.      fluticasone propionate (FLONASE) 50 mcg/actuation nasal spray 1 spray (50 mcg total) by Each Nostril route once daily. 16 g 3    hydroCHLOROthiazide (MICROZIDE) 12.5 mg capsule TAKE 1 CAPSULE(12.5 MG) BY MOUTH EVERY MORNING 90 capsule 3    hydroxychloroquine (PLAQUENIL) 200 mg tablet Take 2 tablets (400 mg total) by mouth once daily. 60 tablet 11    losartan (COZAAR) 50 MG tablet TAKE 1 TABLET(50 MG) BY MOUTH EVERY MORNING 90 tablet 3    meloxicam (MOBIC) 15 MG tablet TAKE 1 TABLET(15 MG) BY MOUTH DAILY AS NEEDED FOR PAIN 90 tablet 1    metoprolol succinate (TOPROL-XL) 25 MG 24 hr tablet Take 0.5-1 tablets (12.5-25 mg total) by mouth once daily. 90 tablet 3    multivitamin capsule Take 1 capsule by mouth once daily.      predniSONE (DELTASONE) 20 MG tablet Take 2 tablets first day, then 1 tablet daily until tablets gone; take with food 7 tablet 0    rosuvastatin (CRESTOR) 10 MG tablet TAKE 1 TABLET(10 MG) BY MOUTH EVERY EVENING 90 tablet 3    testosterone cypionate (DEPOTESTOTERONE CYPIONATE) 200 mg/mL injection INJECT 1 ML INTO THE MUSCLE EVERY 14 DAYS 6 mL 1       Past Surgical History:   Procedure Laterality Date    COLONOSCOPY N/A 6/29/2016    Procedure: COLONOSCOPY;  Surgeon: Logan Tsai Jr., MD;  Location: [facility] ENDO;  Service: Endoscopy;  Laterality: N/A;    HERNIA REPAIR Right 2006    groin    LAPAROSCOPIC CHOLECYSTECTOMY N/A 9/12/2018    Procedure: CHOLECYSTECTOMY, LAPAROSCOPIC;  Surgeon: Yuval Cadena MD;  Location: [facility] OR;  Service: General;  Laterality: N/A;    SHOULDER SURGERY  2011    Right    TONSILLECTOMY         Social  History:  Tobacco Use: Low Risk  (11/29/2024)    Patient History     Smoking Tobacco Use: Never     Smokeless Tobacco Use: Never     Passive Exposure: Never       Alcohol Use: Not At Risk (12/22/2023)    AUDIT-C     Frequency of Alcohol Consumption: Monthly or less     Average Number of Drinks: 1 or 2     Frequency of Binge Drinking: Less than monthly       Objective    Vital Signs Range:  BMI Readings from Last 1 Encounters:   12/16/24 33.38 kg/m²               Significant Labs:        Component Value Date/Time    WBC 18.48 (H) 11/13/2024 1810    HGB 15.5 11/13/2024 1810    HCT 46.0 11/13/2024 1810     11/13/2024 1810     (L) 11/13/2024 1810    K 4.0 11/13/2024 1810    CL 97 11/13/2024 1810    CO2 31 11/13/2024 1810     (H) 11/13/2024 1810    BUN 20 11/13/2024 1810    CREATININE 1.13 11/13/2024 1810    MG 2.2 11/13/2024 1810    CALCIUM 9.0 11/13/2024 1810    ALBUMIN 4.4 11/13/2024 1810    PROT 7.2 11/13/2024 1810    ALKPHOS 87 11/13/2024 1810    BILITOT 0.4 11/13/2024 1810    AST 40 11/13/2024 1810    ALT 41 11/13/2024 1810    HGBA1C 5.5 11/25/2023 0936        Please see Results Review for additional labs.     Diagnostic Studies: All relevant studies, reviewed.      EKG:   Results for orders placed or performed during the hospital encounter of 11/13/24   EKG 12-lead    Collection Time: 11/13/24  4:41 PM   Result Value Ref Range    QRS Duration 80 ms    [facility] QTC Calculation 387 ms    Narrative    Test Reason : R55,    Vent. Rate :  56 BPM     Atrial Rate :  56 BPM     P-R Int : 140 ms          QRS Dur :  80 ms      QT Int : 402 ms       P-R-T Axes :  47  39  10 degrees    QTcB Int : 387 ms    Sinus bradycardia  Borderline LVH criteria in AVL  Borderline Abnormal ECG  When compared with ECG of 30-Dec-2013 14:12,  No significant change was found  Confirmed by Yuval Bernard ([id number]) on 11/14/2024 2:52:25 PM    Referred By: AAAREFERRAL SELF           Confirmed By: Yuval Bernard       ECHO:  No results  found for this or any previous visit.         Pre-op Assessment    I have reviewed the Patient Summary Reports.     I have reviewed the Nursing Notes.    I have reviewed the Medications.     Review of Systems  Anesthesia Hx:  No previous Anesthesia                Cardiovascular:     Hypertension           hyperlipidemia                         Hypertension         Hepatic/GI:     GERD         Gerd          Musculoskeletal:  Arthritis               Psych:  Psychiatric History  depression                Physical Exam  General: Well nourished, Cooperative and Alert    Airway:  Mallampati: II   Mouth Opening: Normal  TM Distance: Normal  Tongue: Normal  Neck ROM: Normal ROM    Dental:  Intact        Anesthesia Plan  Type of Anesthesia, risks & benefits discussed:    Anesthesia Type: Gen ETT  Intra-op Monitoring Plan: Standard ASA Monitors  Post Op Pain Control Plan: multimodal analgesia  Induction:  IV  Airway Plan: Video, Post-Induction  ASA Score: 3  Day of Surgery Review of History & Physical: H&P Update referred to the surgeon/provider.    Ready For Surgery From Anesthesia Perspective.     .

## 2024-12-19 ENCOUNTER — HOSPITAL ENCOUNTER (OUTPATIENT)
Facility: HOSPITAL | Age: 59
Discharge: HOME | End: 2024-12-19
Attending: STUDENT IN AN ORGANIZED HEALTH CARE EDUCATION/TRAINING PROGRAM | Admitting: STUDENT IN AN ORGANIZED HEALTH CARE EDUCATION/TRAINING PROGRAM
Payer: COMMERCIAL

## 2024-12-19 ENCOUNTER — ANESTHESIA (OUTPATIENT)
Dept: SURGERY | Facility: HOSPITAL | Age: 59
End: 2024-12-19
Payer: COMMERCIAL

## 2024-12-19 VITALS
TEMPERATURE: 98 F | DIASTOLIC BLOOD PRESSURE: 90 MMHG | HEART RATE: 89 BPM | OXYGEN SATURATION: 97 % | HEIGHT: 66 IN | WEIGHT: 200 LBS | BODY MASS INDEX: 32.14 KG/M2 | RESPIRATION RATE: 16 BRPM | SYSTOLIC BLOOD PRESSURE: 155 MMHG

## 2024-12-19 DIAGNOSIS — J32.1 FRONTAL SINUSITIS: ICD-10-CM

## 2024-12-19 DIAGNOSIS — J32.9 RECURRENT SINUSITIS: Primary | ICD-10-CM

## 2024-12-19 PROCEDURE — 88311 DECALCIFY TISSUE: CPT | Performed by: PATHOLOGY

## 2024-12-19 PROCEDURE — 63600175 PHARM REV CODE 636 W HCPCS

## 2024-12-19 PROCEDURE — 61782 SCAN PROC CRANIAL EXTRA: CPT | Mod: ,,, | Performed by: STUDENT IN AN ORGANIZED HEALTH CARE EDUCATION/TRAINING PROGRAM

## 2024-12-19 PROCEDURE — 27201423 OPTIME MED/SURG SUP & DEVICES STERILE SUPPLY: Performed by: STUDENT IN AN ORGANIZED HEALTH CARE EDUCATION/TRAINING PROGRAM

## 2024-12-19 PROCEDURE — 37000008 HC ANESTHESIA 1ST 15 MINUTES: Performed by: STUDENT IN AN ORGANIZED HEALTH CARE EDUCATION/TRAINING PROGRAM

## 2024-12-19 PROCEDURE — 31256 EXPLORATION MAXILLARY SINUS: CPT | Mod: 50,51,, | Performed by: STUDENT IN AN ORGANIZED HEALTH CARE EDUCATION/TRAINING PROGRAM

## 2024-12-19 PROCEDURE — 25000003 PHARM REV CODE 250

## 2024-12-19 PROCEDURE — 87075 CULTR BACTERIA EXCEPT BLOOD: CPT | Performed by: STUDENT IN AN ORGANIZED HEALTH CARE EDUCATION/TRAINING PROGRAM

## 2024-12-19 PROCEDURE — 37000009 HC ANESTHESIA EA ADD 15 MINS: Performed by: STUDENT IN AN ORGANIZED HEALTH CARE EDUCATION/TRAINING PROGRAM

## 2024-12-19 PROCEDURE — 25000003 PHARM REV CODE 250: Performed by: STUDENT IN AN ORGANIZED HEALTH CARE EDUCATION/TRAINING PROGRAM

## 2024-12-19 PROCEDURE — 87070 CULTURE OTHR SPECIMN AEROBIC: CPT | Mod: 59 | Performed by: STUDENT IN AN ORGANIZED HEALTH CARE EDUCATION/TRAINING PROGRAM

## 2024-12-19 PROCEDURE — 31276 NSL/SINS NDSC FRNT TISS RMVL: CPT | Mod: 50,22,51, | Performed by: STUDENT IN AN ORGANIZED HEALTH CARE EDUCATION/TRAINING PROGRAM

## 2024-12-19 PROCEDURE — 31257 NSL/SINS NDSC TOT W/SPHENDT: CPT | Mod: 50,,, | Performed by: STUDENT IN AN ORGANIZED HEALTH CARE EDUCATION/TRAINING PROGRAM

## 2024-12-19 PROCEDURE — 87102 FUNGUS ISOLATION CULTURE: CPT | Mod: 59 | Performed by: STUDENT IN AN ORGANIZED HEALTH CARE EDUCATION/TRAINING PROGRAM

## 2024-12-19 PROCEDURE — 36000711: Performed by: STUDENT IN AN ORGANIZED HEALTH CARE EDUCATION/TRAINING PROGRAM

## 2024-12-19 PROCEDURE — 71000016 HC POSTOP RECOV ADDL HR: Performed by: STUDENT IN AN ORGANIZED HEALTH CARE EDUCATION/TRAINING PROGRAM

## 2024-12-19 PROCEDURE — 71000044 HC DOSC ROUTINE RECOVERY FIRST HOUR: Performed by: STUDENT IN AN ORGANIZED HEALTH CARE EDUCATION/TRAINING PROGRAM

## 2024-12-19 PROCEDURE — 63600175 PHARM REV CODE 636 W HCPCS: Performed by: STUDENT IN AN ORGANIZED HEALTH CARE EDUCATION/TRAINING PROGRAM

## 2024-12-19 PROCEDURE — 71000015 HC POSTOP RECOV 1ST HR: Performed by: STUDENT IN AN ORGANIZED HEALTH CARE EDUCATION/TRAINING PROGRAM

## 2024-12-19 PROCEDURE — 88305 TISSUE EXAM BY PATHOLOGIST: CPT | Performed by: PATHOLOGY

## 2024-12-19 PROCEDURE — 36000710: Performed by: STUDENT IN AN ORGANIZED HEALTH CARE EDUCATION/TRAINING PROGRAM

## 2024-12-19 RX ORDER — ONDANSETRON HYDROCHLORIDE 2 MG/ML
INJECTION, SOLUTION INTRAVENOUS
Status: DISCONTINUED | OUTPATIENT
Start: 2024-12-19 | End: 2024-12-19

## 2024-12-19 RX ORDER — ACETAMINOPHEN 325 MG/1
650 TABLET ORAL EVERY 4 HOURS PRN
Status: DISCONTINUED | OUTPATIENT
Start: 2024-12-19 | End: 2024-12-19 | Stop reason: HOSPADM

## 2024-12-19 RX ORDER — ONDANSETRON HYDROCHLORIDE 2 MG/ML
4 INJECTION, SOLUTION INTRAVENOUS EVERY 12 HOURS PRN
Status: DISCONTINUED | OUTPATIENT
Start: 2024-12-19 | End: 2024-12-19 | Stop reason: HOSPADM

## 2024-12-19 RX ORDER — ONDANSETRON 4 MG/1
4 TABLET, ORALLY DISINTEGRATING ORAL EVERY 8 HOURS PRN
Qty: 20 TABLET | Refills: 0 | Status: SHIPPED | OUTPATIENT
Start: 2024-12-19

## 2024-12-19 RX ORDER — CEFAZOLIN 2 G/1
INJECTION, POWDER, FOR SOLUTION INTRAMUSCULAR; INTRAVENOUS
Status: DISCONTINUED | OUTPATIENT
Start: 2024-12-19 | End: 2024-12-19

## 2024-12-19 RX ORDER — PHENYLEPHRINE HCL IN 0.9% NACL 1 MG/10 ML
SYRINGE (ML) INTRAVENOUS
Status: DISCONTINUED | OUTPATIENT
Start: 2024-12-19 | End: 2024-12-19

## 2024-12-19 RX ORDER — VASOPRESSIN 20 [USP'U]/ML
INJECTION, SOLUTION INTRAMUSCULAR; SUBCUTANEOUS
Status: DISCONTINUED | OUTPATIENT
Start: 2024-12-19 | End: 2024-12-19

## 2024-12-19 RX ORDER — GLUCAGON 1 MG
1 KIT INJECTION
Status: DISCONTINUED | OUTPATIENT
Start: 2024-12-19 | End: 2024-12-19 | Stop reason: HOSPADM

## 2024-12-19 RX ORDER — BACITRACIN ZINC 500 UNIT/G
OINTMENT (GRAM) TOPICAL
Status: DISCONTINUED | OUTPATIENT
Start: 2024-12-19 | End: 2024-12-19 | Stop reason: HOSPADM

## 2024-12-19 RX ORDER — HYDROCODONE BITARTRATE AND ACETAMINOPHEN 5; 325 MG/1; MG/1
1 TABLET ORAL EVERY 4 HOURS PRN
Status: DISCONTINUED | OUTPATIENT
Start: 2024-12-19 | End: 2024-12-19 | Stop reason: HOSPADM

## 2024-12-19 RX ORDER — EPINEPHRINE 1 MG/ML
INJECTION, SOLUTION, CONCENTRATE INTRAVENOUS
Status: DISCONTINUED | OUTPATIENT
Start: 2024-12-19 | End: 2024-12-19 | Stop reason: HOSPADM

## 2024-12-19 RX ORDER — HALOPERIDOL 5 MG/ML
0.5 INJECTION INTRAMUSCULAR EVERY 10 MIN PRN
Status: DISCONTINUED | OUTPATIENT
Start: 2024-12-19 | End: 2024-12-19 | Stop reason: HOSPADM

## 2024-12-19 RX ORDER — ACETAMINOPHEN 500 MG
1000 TABLET ORAL
Status: COMPLETED | OUTPATIENT
Start: 2024-12-19 | End: 2024-12-19

## 2024-12-19 RX ORDER — KETAMINE HCL IN 0.9 % NACL 50 MG/5 ML
SYRINGE (ML) INTRAVENOUS
Status: DISCONTINUED | OUTPATIENT
Start: 2024-12-19 | End: 2024-12-19

## 2024-12-19 RX ORDER — DEXAMETHASONE SODIUM PHOSPHATE 4 MG/ML
INJECTION, SOLUTION INTRA-ARTICULAR; INTRALESIONAL; INTRAMUSCULAR; INTRAVENOUS; SOFT TISSUE
Status: DISCONTINUED | OUTPATIENT
Start: 2024-12-19 | End: 2024-12-19

## 2024-12-19 RX ORDER — MIDAZOLAM HYDROCHLORIDE 1 MG/ML
INJECTION INTRAMUSCULAR; INTRAVENOUS
Status: DISCONTINUED | OUTPATIENT
Start: 2024-12-19 | End: 2024-12-19

## 2024-12-19 RX ORDER — ACETAMINOPHEN 325 MG/1
650 TABLET ORAL ONCE
Status: COMPLETED | OUTPATIENT
Start: 2024-12-19 | End: 2024-12-19

## 2024-12-19 RX ORDER — ROCURONIUM BROMIDE 10 MG/ML
INJECTION, SOLUTION INTRAVENOUS
Status: DISCONTINUED | OUTPATIENT
Start: 2024-12-19 | End: 2024-12-19

## 2024-12-19 RX ORDER — FENTANYL CITRATE 50 UG/ML
25 INJECTION, SOLUTION INTRAMUSCULAR; INTRAVENOUS EVERY 5 MIN PRN
Status: DISCONTINUED | OUTPATIENT
Start: 2024-12-19 | End: 2024-12-19 | Stop reason: HOSPADM

## 2024-12-19 RX ORDER — PROPOFOL 10 MG/ML
VIAL (ML) INTRAVENOUS
Status: DISCONTINUED | OUTPATIENT
Start: 2024-12-19 | End: 2024-12-19

## 2024-12-19 RX ORDER — LIDOCAINE HYDROCHLORIDE AND EPINEPHRINE 10; 10 UG/ML; MG/ML
INJECTION, SOLUTION INFILTRATION; PERINEURAL
Status: DISCONTINUED | OUTPATIENT
Start: 2024-12-19 | End: 2024-12-19 | Stop reason: HOSPADM

## 2024-12-19 RX ORDER — HYDROCODONE BITARTRATE AND ACETAMINOPHEN 5; 325 MG/1; MG/1
1 TABLET ORAL EVERY 6 HOURS PRN
Qty: 20 TABLET | Refills: 0 | Status: SHIPPED | OUTPATIENT
Start: 2024-12-19

## 2024-12-19 RX ORDER — TRIAMCINOLONE ACETONIDE 40 MG/ML
INJECTION, SUSPENSION INTRA-ARTICULAR; INTRAMUSCULAR
Status: DISCONTINUED | OUTPATIENT
Start: 2024-12-19 | End: 2024-12-19 | Stop reason: HOSPADM

## 2024-12-19 RX ORDER — SODIUM CHLORIDE 0.9 % (FLUSH) 0.9 %
10 SYRINGE (ML) INJECTION
Status: DISCONTINUED | OUTPATIENT
Start: 2024-12-19 | End: 2024-12-19 | Stop reason: HOSPADM

## 2024-12-19 RX ORDER — LIDOCAINE HYDROCHLORIDE 20 MG/ML
INJECTION INTRAVENOUS
Status: DISCONTINUED | OUTPATIENT
Start: 2024-12-19 | End: 2024-12-19

## 2024-12-19 RX ORDER — OXYMETAZOLINE HCL 0.05 %
SPRAY, NON-AEROSOL (ML) NASAL
Status: DISCONTINUED | OUTPATIENT
Start: 2024-12-19 | End: 2024-12-19 | Stop reason: HOSPADM

## 2024-12-19 RX ORDER — FENTANYL CITRATE 50 UG/ML
INJECTION, SOLUTION INTRAMUSCULAR; INTRAVENOUS
Status: DISCONTINUED | OUTPATIENT
Start: 2024-12-19 | End: 2024-12-19

## 2024-12-19 RX ADMIN — VASOPRESSIN 3 UNITS: 20 INJECTION INTRAVENOUS at 08:12

## 2024-12-19 RX ADMIN — VASOPRESSIN 2 UNITS: 20 INJECTION INTRAVENOUS at 08:12

## 2024-12-19 RX ADMIN — Medication 200 MCG: at 08:12

## 2024-12-19 RX ADMIN — ACETAMINOPHEN 1000 MG: 500 TABLET ORAL at 06:12

## 2024-12-19 RX ADMIN — MIDAZOLAM HYDROCHLORIDE 2 MG: 2 INJECTION, SOLUTION INTRAMUSCULAR; INTRAVENOUS at 06:12

## 2024-12-19 RX ADMIN — FENTANYL CITRATE 100 MCG: 50 INJECTION, SOLUTION INTRAMUSCULAR; INTRAVENOUS at 07:12

## 2024-12-19 RX ADMIN — DEXAMETHASONE SODIUM PHOSPHATE 4 MG: 4 INJECTION, SOLUTION INTRAMUSCULAR; INTRAVENOUS at 07:12

## 2024-12-19 RX ADMIN — SUGAMMADEX 200 MG: 100 INJECTION, SOLUTION INTRAVENOUS at 10:12

## 2024-12-19 RX ADMIN — CEFAZOLIN 2 G: 2 INJECTION, POWDER, FOR SOLUTION INTRAMUSCULAR; INTRAVENOUS at 07:12

## 2024-12-19 RX ADMIN — PROPOFOL 125 MCG/KG/MIN: 10 INJECTION, EMULSION INTRAVENOUS at 07:12

## 2024-12-19 RX ADMIN — LIDOCAINE HYDROCHLORIDE 100 MG: 20 INJECTION INTRAVENOUS at 07:12

## 2024-12-19 RX ADMIN — ROCURONIUM BROMIDE 20 MG: 10 INJECTION, SOLUTION INTRAVENOUS at 10:12

## 2024-12-19 RX ADMIN — ROCURONIUM BROMIDE 20 MG: 10 INJECTION, SOLUTION INTRAVENOUS at 08:12

## 2024-12-19 RX ADMIN — Medication 10 MG: at 10:12

## 2024-12-19 RX ADMIN — PROPOFOL 200 MG: 10 INJECTION, EMULSION INTRAVENOUS at 07:12

## 2024-12-19 RX ADMIN — VASOPRESSIN 2 UNITS: 20 INJECTION INTRAVENOUS at 09:12

## 2024-12-19 RX ADMIN — VASOPRESSIN 1 UNITS: 20 INJECTION INTRAVENOUS at 09:12

## 2024-12-19 RX ADMIN — ACETAMINOPHEN 650 MG: 325 TABLET ORAL at 01:12

## 2024-12-19 RX ADMIN — ONDANSETRON 4 MG: 2 INJECTION INTRAMUSCULAR; INTRAVENOUS at 10:12

## 2024-12-19 RX ADMIN — SODIUM CHLORIDE: 0.9 INJECTION, SOLUTION INTRAVENOUS at 07:12

## 2024-12-19 RX ADMIN — ROCURONIUM BROMIDE 20 MG: 10 INJECTION, SOLUTION INTRAVENOUS at 09:12

## 2024-12-19 RX ADMIN — ROCURONIUM BROMIDE 60 MG: 10 INJECTION, SOLUTION INTRAVENOUS at 07:12

## 2024-12-19 RX ADMIN — Medication 100 MCG: at 07:12

## 2024-12-19 RX ADMIN — Medication 200 MCG: at 07:12

## 2024-12-19 RX ADMIN — Medication 20 MG: at 08:12

## 2024-12-19 RX ADMIN — PHENYLEPHRINE HYDROCHLORIDE 0.4 MCG/KG/MIN: 10 INJECTION INTRAVENOUS at 09:12

## 2024-12-19 RX ADMIN — Medication 300 MCG: at 08:12

## 2024-12-19 RX ADMIN — Medication 10 MG: at 09:12

## 2024-12-19 RX ADMIN — Medication 100 MCG: at 09:12

## 2024-12-19 RX ADMIN — Medication 150 MCG: at 07:12

## 2024-12-19 NOTE — TRANSFER OF CARE
"Anesthesia Transfer of Care Note    Patient: Lucien Linares    Procedure(s) Performed: Procedure(s) (LRB):  FESS, USING COMPUTER-ASSISTED NAVIGATION- [employer] (Bilateral)    Patient location: [facility]    Anesthesia Type: general    Transport from OR: Transported from OR on 6-10 L/min O2 by face mask with adequate spontaneous ventilation    Post pain: adequate analgesia    Post assessment: no apparent anesthetic complications and tolerated procedure well    Post vital signs: stable    Level of consciousness: sedated    Nausea/Vomiting: no nausea/vomiting    Complications: none    Transfer of care protocol was followed      Last vitals: Visit Vitals  BP (!) 155/79   Pulse 98   Temp 36.6 °C (97.9 °F)   Resp 16   Ht 5' 6" (1.676 m)   Wt 90.7 kg (200 lb)   SpO2 95%   BMI 32.28 kg/m²     "

## 2024-12-19 NOTE — BRIEF OP NOTE
[facility] - Surgery ([facility])  Brief Operative Note    SUMMARY     Surgery Date: 12/19/2024     Surgeons and Role:     * Regulo Barker MD - Primary    Assisting Surgeon: Tami Guzman MD      Pre-op Diagnosis:  Chronic frontal sinusitis [J32.1]  Recurrent sinusitis [J32.9]    Post-op Diagnosis:  Post-Op Diagnosis Codes:     * Chronic frontal sinusitis [J32.1]     * Recurrent sinusitis [J32.9]    Procedure(s) (LRB):  FESS, USING COMPUTER-ASSISTED NAVIGATION- [employer] (Bilateral)    Anesthesia: General    Implants:  * No implants in log *    Operative Findings: see op note    Estimated Blood Loss: * No values recorded between 12/19/2024  7:50 AM and 12/19/2024 10:43 AM *    Estimated Blood Loss has not been documented. EBL = 10cc.         Specimens:   Specimen (24h ago, onward)      None            [id number]          Discharge Note    OUTCOME: Patient tolerated treatment/procedure well without complication and is now ready for discharge.    DISPOSITION: Home or Self Care    FINAL DIAGNOSIS: frontal sinusitis    FOLLOWUP: In clinic    DISCHARGE INSTRUCTIONS:    Discharge Procedure Orders   Diet general     No dressing needed     Call MD for:  severe uncontrolled pain     Call MD for:  redness, tenderness, or signs of infection (pain, swelling, redness, odor or green/yellow discharge around incision site)     Activity as tolerated

## 2024-12-19 NOTE — ANESTHESIA PROCEDURE NOTES
Intubation    Date/Time: 12/19/2024 7:27 AM    Performed by: Jeb Corbett MD  Authorized by: Megan Canales MD    Intubation:     Induction:  Intravenous    Intubated:  Postinduction    Mask Ventilation:  Very difficult with oral airway    Attempts:  2    Attempted By:  Resident anesthesiologist    Method of Intubation:  Video laryngoscopy    Blade:  Lopez 3    Laryngeal View Grade: Grade IIb - only the arytenoids and epiglottis seen      Attempted By (2nd Attempt):  Staff anesthesiologist    Method of Intubation (2nd Attempt):  Video laryngoscopy    Blade (2nd Attempt):  Lopez 3    Laryngeal View Grade (2nd Attempt): Grade IIa - cords partially seen      Difficult Airway Encountered?: No      Complications:  None    Airway Device:  Oral endotracheal tube    Airway Device Size:  7.5    Style/Cuff Inflation:  Cuffed (inflated to minimal occlusive pressure)    Tube secured:  23    Secured at:  The lips    Placement Verified By:  Capnometry    Complicating Factors:  None    Findings Post-Intubation:  BS equal bilateral

## 2024-12-19 NOTE — ANESTHESIA POSTPROCEDURE EVALUATION
Anesthesia Post Evaluation    Patient: Lucien Linares    Procedure(s) Performed: Procedure(s) (LRB):  FESS, USING COMPUTER-ASSISTED NAVIGATION- [employer] (Bilateral)    Final Anesthesia Type: general      Patient location during evaluation: [facility]  Patient participation: Yes- Able to Participate  Level of consciousness: awake and alert and oriented  Post-procedure vital signs: reviewed and stable  Pain management: adequate  Airway patency: patent    PONV status at discharge: No PONV  Anesthetic complications: no      Cardiovascular status: blood pressure returned to baseline  Respiratory status: unassisted and spontaneous ventilation  Hydration status: euvolemic  Follow-up not needed.              Vitals Value Taken Time   /79 12/19/24 1301   Temp 36.6 °C (97.9 °F) 12/19/24 1115   Pulse 92 12/19/24 1309   Resp 17 12/19/24 1309   SpO2 97 % 12/19/24 1309   Vitals shown include unfiled device data.      No case tracking events are documented in the log.      Pain/Kimberlyn Score: Pain Rating Prior to Med Admin: 5 (12/19/2024  1:10 PM)  Kimberlyn Score: 9 (12/19/2024 12:15 PM)

## 2024-12-19 NOTE — OP NOTE
12/19/2024    PREOPERATIVE DIAGNOSIS(ES):    1.   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  2.   Chronic sinusitis.  3.   History of endoscopic sinus surgery.     POSTOPERATIVE DIAGNOSIS(ES):    1.   Same     PROCEDURE:    1.   Bilateral maxillary antrostomy with removal of tissue.  2.   Bilateral ethmoidectomy, complete.  3.   Bilateral sphenoidotomy with removal of tissue.  4.   Bilateral frontal sinusotomy.  5.   Use of [employer] neuronavigation.     ANESTHESIA:  General endotracheal.     SURGEON(S):  Regulo Barker MD     ASSISTANT:  Tami Guzman MD     ESTIMATED BLOOD LOSS:  Minimal.     COMPLICATIONS:  None.      FINDINGS:    1.   Complete obstruction of the left frontal outflow tract with a osteolytic bone postobstructive purulent secretions noted.  Significant swelling of the left forehead consistent with Pott's puffy tumor.  2.   Wide left-sided Draf 2B frontal sinusotomy  3.   Thick dense secretions noted within the right maxillary sinus.     INDICATIONS FOR PROCEDURE:  This is a [age]-year-old male with a history of episodic swelling in his left frontal forehead.  He had previously undergone endoscopic sinus surgery without resolution of his symptoms.  He then proceeded with a balloon dilation which was not able to be completed due to a syncopal episode.  Who is referred to me where a CT imaging showed significant swelling of the left forehead with a bony erosion of the anterior table of the frontal sinus consistent with frontal osteomyelitis.  He is strongly advised to undergo revision endoscopic sinus surgery the treatment of the above.    The risks, benefits, and alternatives of surgery were discussed at length with the patient and include, but are not limited to bleeding, infection, need for revision surgery, injury to the eye or brain, double vision, loss of vision, cerebrospinal fluid leakage, meningitis, chronic sinusitis, nasal obstruction, nasal septal perforation, numbness to  teeth or cheek, or need for time and expense off work.  The patient demonstrates understanding and wishes to proceed.  All of his questions were answered to his satisfaction.     DESCRIPTION OF PROCEDURE:  The patient was taken back to the operating room and after successful induction of general anesthesia, was prepped and draped in a sterile fashion.  The [employer] neuronavigation system was employed.  The images were uploaded, the instruments were calibrated, and accuracy was confirmed.  Afrin pledgets were used intranasally for decongestion, and approximately 8 cc of 1% lidocaine with epinephrine 1:100,000 was used intranasally.     A pre-operative timeout was taken to confirm the correct patient and procedure being performed.      Attention was then turned to the left side.  Until was previous evidence of endoscopic sinus surgery with partial resection of the left middle turbinate, however there was significant stenosis of the frontal recess with the residual anterior and posterior ethmoid cells.  Partial uncinectomy was noted with some residual uncinate process noted.  The  residual uncinate process was then identified and taken down in a standard fashion, backbiting forceps was used to remove the residual uncinate to communicate into the natural maxillary ostium. An inferior window was then placed in the inferior aspect of the maxillary sinus to assist with debridement.  The free edges of the  inferior window were then cauterized using suction monopolar cautery. Attention was then returned to the middle meatus.  Using a [employer] microdebrider, the ethmoid bulla was perforated and dissection was carried posteriorly until the sphenoid face was reached.  Dissection then proceeded anteriorly along the skull base and lamina papyracea in order to skeletonize these structures.  Of note, the ethmoid labyrinth was very stenotic with osteitic bone residual ethmoid cells noted to have infected mucocele.  Next, the natural  sphenoid ostium was identified and the sphenoid sinus was opened widely using 2 and 3 mm Koros Kerrison rongeurs and communicated into the ethmoid cavity.     Next, an endoscopic approach to the anterior cranial base was performed in order to remove infectious debris along the anterior cranial face, in the base of the anterior fossa, from the frontal sinus. A Draf IIB frontal drill out endoscopic procedure was performed beginning inside the frontal sinus, and a 70-degree reverse taper cutting drill was used to remove bone along the anterior table of the frontal sinus and the frontal beak region. The anterior portions of the middle turbinate was trimmed. The bone was thinned from orbit to the nasal septum to an eggshell bone anteriorly along the frontal beak. A large amount of infectious debris was removed along the anterior cranial base within the left frontal sinus. This required an extensive amount of additional time, drilling of bone, removal polypoid infectious debris from within the frontal sinus and along the anterior cranial base compared any standard procedure.      Attention was then turned to the right side and a similar procedure was performed.  The uncinate process was identified and taken down in a standard fashion.  The natural maxillary sinus ostium was identified and opened widely using straight and backbiting instruments.  The sinus was filled with thick purulent secretions, cultures were obtained.  Next, attention was returned to the ethmoid.  Using a [employer]omed microdebrider, the ethmoid bulla was perforated and dissection was carried posteriorly until the sphenoid face was reached.  Dissection then was carried anteriorly along the skull base and lamina papyracea in order to skeletonize these structures.   Next, the natural sphenoid ostium was identified and the sphenoid sinus was opened widely using 2 and 3 mm Koros Kerrison rongeurs and communicated into the ethmoid cavity. Next, a 30-degree  endoscope and frontal sinus suction were then used to complete the anterior ethmoidectomy.  The natural frontal sinus outflow tract was identified, cannulated, and the frontal sinus was opened widely in a submucosal fashion       At the conclusion of the case, the nose was copiously irrigated with saline solution to remove all debris and both inferior turbinates were outfractured.  Hemostasis was obtained throughout using suction monopolar cautery as well as dilute epinephrine/saline pledgets until hemostasis was achieved.  Puragel was placed in a thin coat in the left frontal recess over the exposed bone of the anterior and posterior table in order to help with the wound healing.  Vectra stents were placed in the bilateral frontal outflow tracts to maintain patency and Novapak was placed in the bilateral middle meatus to ensure appropriate middle turbinate medialization.     The patient was then awakened, extubated, and taken to recovery room in stable condition.      I was scrubbed and personally present during the key portions of this procedure, and I was immediately available throughout the entire procedure.    Regulo Barker MD

## 2024-12-19 NOTE — DISCHARGE INSTRUCTIONS
INSTRUCTIONS TO FOLLOW AFTER SINUS AND NASAL SURGERY  DR. BARTON - [facility]    Office hours:  Weekdays 8:00 am to 5:00 pm.  Please call [phone] and ask to speak with his nurse, Evon.    After-hours & weekends:  Please call [phone] and ask to speak with the ENT resident doctor.    Your first office visit with Dr. Barker after surgery should have been already scheduled.  If you dont know when it is, call Dr. Barker's nurse Evon at [phone].    Please call IMMMEDIATELY if you have:  Temperature of 101° F or greater  Any unusual, painful swelling  Any active bleeding that saturates more than a 4x4 gauze  Any thick drainage green or yellow drainage  Changes in vision or swelling around the eye  Pain not relieved by your prescribed pain medication    ACTIVITY:    Sleep on your back with the head of the bead elevated, up on 2-3 pillows, or in a recliner for the first 3 to 5 days. This will help with swelling.     After surgery you may have a lot of nasal drainage. This is normal. You may breathe through your nose if youre able but avoid inhaling forcibly. Let all drainage fall on your mustache dressing and change it as needed.    You may wake up after surgery with thick white stockings on. Wear them until you are walking around more. It is important to walk around often while at home to keep your blood circulating and prevent blood clots.    If you use CPAP or BiPAP to sleep at night, you should wait at least 48 hours before resuming use.  Dr. Barker will advise you when it is safe to do this.    You may shower 24 hours after surgery.    RESTRICTED ACTIVITIES AFTER SURGERY:    Do NOT blow your nose until you see Dr. Barker in clinic. If you have to sneeze or cough, do so with your mouth open.     AVOID all heavy lifting, straining or bending for 2 weeks.     AVOID any sexual activity for 2 weeks after surgery.    AVOID semi-contact sports or vigorous exercising for 3-4 weeks. Dr. Barker will let you know  when you are cleared to resume exercise.    AVOID flying or swimming for 2 weeks.      Do NOT operate a motor vehicle or any type of heavy machinery within 24 hours of taking pain medication.    DO NOT smoke or be around smokers.    AVOID irritating substances that might make you sneeze, such as dust, chalk, harsh chemicals, and allergic triggers.  This might also include spicy foods.    DRESSINGS:    Change the gauze mustache dressing under your nose as needed. (If unsure what this dressing is or how to do this, ask your doctor or nurse before you leave the hospital.) You may have pinkish-red drainage for 2-3 days.    Usually there is no gauze packing placed inside the nose.  If packing is necessary, you will be informed by your surgeon.  Do not touch or pull at the packing. The packing will be removed by your doctor at your first visit after surgery.     You may also have a dissolvable stent or dissolvable sponge placed into the sinuses during surgery.  These usually do not need to be removed unless you are told otherwise by Dr. Barker.  You may notice small fragments of these items come out of your nose in the weeks following surgery.    MEDICATIONS:    After surgery, you will be sent home with prescriptions for pain medication and an anti-nausea medication.  Antibiotics are usually not necessary.    Most people need pain medication for the first few days after surgery, although a narcotic is rarely necessary.  The best pain control comes from a combination of acetaminophen (Tylenol) and ibuprofen (Motrin).  You will be given prescriptions for these at the recommended dose.  These can be alternated so that you are taking something every 2 or 3 hours.    Some people have problems with bowel movements after surgery. If you have NOT had a bowel movement 3-5 days after surgery, go to your local pharmacy and purchase an over the counter stool softener such as COLACE. You can also ask the pharmacist for his or her  recommendation. If you still do not have a bowel movement after starting the softener, please call the office.    You will need these over-the-counter medications after surgery:    SALINE SINUS RINSE ([employer]er Med brand):  You will use this to rinse out your nose and sinuses after surgery.  Begin doing this the day after surgery, unless instructed otherwise by Dr. Barker.  You should do this 2 times a day, following the instructions on the box.  AFRIN (regular strength): Only use if you have nasal congestion or bleeding. Use 2 times per day for 3 days, stop for 1 day, continue 2 times per day for 3 days, then stop completely.  NOTE:  You may not need to do this at all.    DIET:    Avoid hot and spicy foods for 1 week after surgery.    Begin with bland foods the evening after surgery and advance to your regular diet as tolerated.  It is not necessary to take only soft food unless you are recovering from tonsil surgery.    Drink plenty of fluids (water is best).     Avoid alcoholic and caffeinated beverages for 1 week after surgery because they can cause you to become dehydrated.

## 2024-12-21 LAB
BACTERIA SPEC AEROBE CULT: NORMAL
BACTERIA SPEC AEROBE CULT: NORMAL

## 2024-12-23 LAB
BACTERIA SPEC ANAEROBE CULT: NORMAL
BACTERIA SPEC ANAEROBE CULT: NORMAL
FINAL PATHOLOGIC DIAGNOSIS: NORMAL
FUNGUS SPEC CULT: NORMAL
FUNGUS SPEC CULT: NORMAL
GROSS: NORMAL
Lab: NORMAL

## 2024-12-27 ENCOUNTER — OFFICE VISIT (OUTPATIENT)
Dept: OTOLARYNGOLOGY | Facility: CLINIC | Age: 59
End: 2024-12-27
Payer: COMMERCIAL

## 2024-12-27 ENCOUNTER — PATIENT MESSAGE (OUTPATIENT)
Dept: INFECTIOUS DISEASES | Facility: CLINIC | Age: 59
End: 2024-12-27
Payer: COMMERCIAL

## 2024-12-27 ENCOUNTER — PATIENT MESSAGE (OUTPATIENT)
Dept: OTOLARYNGOLOGY | Facility: CLINIC | Age: 59
End: 2024-12-27

## 2024-12-27 VITALS
BODY MASS INDEX: 33.77 KG/M2 | HEART RATE: 98 BPM | DIASTOLIC BLOOD PRESSURE: 82 MMHG | WEIGHT: 209.19 LBS | SYSTOLIC BLOOD PRESSURE: 141 MMHG

## 2024-12-27 DIAGNOSIS — M86.8X8 POTT'S PUFFY TUMOR (FRONTAL BONE OSTEOMYELITIS WITH SUBPERIOSTEAL ABSCESS): Primary | ICD-10-CM

## 2024-12-27 DIAGNOSIS — J32.1 CHRONIC FRONTAL SINUSITIS: ICD-10-CM

## 2024-12-27 PROCEDURE — 99999 PR PBB SHADOW E&M-EST. PATIENT-LVL IV: CPT | Mod: PBBFAC,,, | Performed by: STUDENT IN AN ORGANIZED HEALTH CARE EDUCATION/TRAINING PROGRAM

## 2024-12-27 NOTE — PROGRESS NOTES
Subjective:      Lucien is a 59 y.o. male who comes for follow-up of chronic frontal sinusitis. He is s/p ESS with FDO. Cultures from sx are sterile, fungal cx still pending. He started on IV meropenem. Pathology discussed.     His current sinus regime consists of: Saline irrigations.     The assessment of quality and severity of symptoms as measured by the SNOT-22 score: : (Patient-Rptd) (P) 16    The patient's medications, allergies, past medical, surgical, social and family histories were reviewed and updated as appropriate.    Review of Systems   Constitutional:  Positive for malaise/fatigue.   Eyes: Negative.    Cardiovascular: Negative.    Gastrointestinal: Negative.    Genitourinary: Negative.    Musculoskeletal: Negative.    Skin: Negative.    Neurological: Negative.    Psychiatric/Behavioral: Negative.        Answers submitted by the patient for this visit:  Review of Symptoms Questionnaire  (Submitted on 12/26/2024)  Sinus infection(s)?: Yes  postnasal drip: Yes  Snoring?: Yes  None of these: Yes  None of these : Yes  None of these: Yes    A detailed review of systems was obtained with pertinent positives as per the above HPI, and otherwise negative.        Objective:     BP (!) 141/82 (BP Location: Left arm, Patient Position: Sitting)   Pulse 98   Wt 94.9 kg (209 lb 3.5 oz)   BMI 33.77 kg/m²        Constitutional:   Vital signs are normal. He appears well-developed and well-nourished.     Head:  Normocephalic and atraumatic.         Ears:  Hearing normal to normal and whispered voice; external ear normal without scars, lesions, or masses; ear canal, tympanic membrane, and middle ear normal..   Right Ear: No swelling. Tympanic membrane is not perforated and not bulging. No middle ear effusion.   Left Ear: No swelling. Tympanic membrane is not perforated and not bulging.  No middle ear effusion.     Nose:  Nose normal including turbinates, nasal mucosa, sinuses and nasal septum. No epistaxis.      Mouth/Throat  Oropharynx clear and moist without lesions or asymmetry and normal uvula midline. Normal dentition. No tonsillar abscesses. Tonsillar exudate.      Neck:  Neck normal without thyromegaly masses, asymmetry, normal tracheal structure, crepitus, and tenderness, thyroid normal, trachea normal, phonation normal, full range of motion with neck supple and no adenopathy. No stridor present.        Head (right side): No submental adenopathy present.        Head (left side): No submental adenopathy present.     He has no cervical adenopathy.     Pulmonary/Chest:   No stridor.       Procedure    Nasal endoscopy with debridement performed.  See procedure note.    Nasal Endoscopy with Debridement, Bilateral.     Nasal endoscopy and debridement were performed in accordance with the 0 day global period for endoscopic sinus surgery and are unrelated to any other recently performed procedures.     Topical Agents: Topical 0.25% phenylephrine and 4% lidocaine    A 0 degree rigid nasal endoscope was inserted into the nose to visualize the nasal passageway and paranasal sinuses. Under endoscopic visualization, a combination of instruments including suction and grasping forceps were used to debride crust, debris, inflammatory tissue and nasal polyps from the nasal cavity, paranasal sinuses and sinus drainage pathways. This was performed to aid in healing and optimize the patency and function of the sinus cavities and nasal passageways. This is necessary to avoid scar formation, infection, and mucocele formation. In addition, this facilitates in the optimal instillation of topical therapies, saline irrigations, long-term disease surveillance, and endoscopically-derived cultures. The endoscope was withdrawn without sequelae. The procedure was well tolerated by the patient.    The underlying sinus cavities were healing very well. Large left frontal sinusotomy healing well. Patent ethmoid cavity. Right frontal recess  patent. Debris from left middle meatus removed.     Data Reviewed    WBC (K/uL)   Date Value   12/23/2024 15.03 (H)     Eosinophil % (%)   Date Value   12/23/2024 0.0     Eos # (K/uL)   Date Value   11/13/2024 0.2     Platelets (K/uL)   Date Value   12/23/2024 367     Glucose (mg/dL)   Date Value   12/23/2024 65 (L)     Total IgE (IU/mL)   Date Value   11/25/2024 60       No sinus imaging available.      Assessment:     1. 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 Chronic frontal sinusitis         Plan:     - Cont IV brisa  - RTC 3 weeks for debridement.     Regulo Barker MD

## 2025-01-02 ENCOUNTER — OFFICE VISIT (OUTPATIENT)
Dept: INFECTIOUS DISEASES | Facility: CLINIC | Age: 60
End: 2025-01-02
Payer: COMMERCIAL

## 2025-01-02 VITALS
DIASTOLIC BLOOD PRESSURE: 92 MMHG | BODY MASS INDEX: 33.27 KG/M2 | WEIGHT: 207 LBS | HEART RATE: 76 BPM | TEMPERATURE: 98 F | SYSTOLIC BLOOD PRESSURE: 152 MMHG | HEIGHT: 66 IN

## 2025-01-02 DIAGNOSIS — M86.8X8: Primary | ICD-10-CM

## 2025-01-02 PROCEDURE — 99999 PR PBB SHADOW E&M-EST. PATIENT-LVL IV: CPT | Mod: PBBFAC,,, | Performed by: INTERNAL MEDICINE

## 2025-01-02 RX ORDER — LEVOFLOXACIN 750 MG/1
750 TABLET ORAL DAILY
Qty: 7 TABLET | Refills: 0 | Status: SHIPPED | OUTPATIENT
Start: 2025-01-02 | End: 2025-01-09

## 2025-01-02 RX ORDER — SODIUM CHLORIDE/SODIUM BICARB
PACKET (EA) NASAL
COMMUNITY
Start: 2024-12-17

## 2025-01-02 NOTE — PROGRESS NOTES
Subjective:     Patient ID: Lucien Linares is a 59 y.o. male    Chief Complaint: sinusitis    HPI:  59M who is referred to ID for chronic sinus infections and swelling of L side of his forehead. He does not have significant congestion, drainage, pain or headaches. He notes history of prior sinus surgery. Recent imaging with complete opacification of L frontal and anterior ethmoid air cells w/ bony erosion through L frontal sinus, and additional scattered sinus disease in paranasal and R maxillary sinuses. ENT is planning on FESS on 12/19. He has been referred to ID for antibiotic management. Pt is not currently on antibiotics. Recent nares cx from 11/29 positive only for prevotella.     Interval history: 1/2/25  Patient is s/p FESS  on 12/19/24 with bilateral maxillary antrostomy, bilateral ethmoidectomy, bilateral sphenoidotomy, bilateral frontal sinusotomy. Operative cultures are negative. On discussion w/ ENT, operative findings included 2 areas of significant purulence with differing appearances. Pathology specimens noting chronic osteomyelitis and inflammatory change. Per discussion w/ ENT, pt still has residual infected bone that has not been resected. Patient is currently on a course of IV meropenem with tentative plans to complete 6 weeks post-op (EOC 1/31/25).  No issues w/ antibiotic or PICC line. He notes he has felt fatigued post-operatively, but is improving.       Immunization History   Administered Date(s) Administered    COVID-19 MRNA, LN-S PF (MODERNA HALF 0.25 ML DOSE) 12/13/2021    COVID-19, MRNA, LN-S, PF (MODERNA FULL 0.5 ML DOSE) 02/22/2021, 03/23/2021    Influenza 08/27/2022    Influenza - Quadrivalent - MDCK - PF 10/06/2017, 08/20/2022, 11/01/2023    Influenza - Quadrivalent - PF *Preferred* (6 months and older) 11/23/2015, 09/22/2016, 10/06/2017, 10/23/2018, 09/26/2021    Influenza - Trivalent - Afluria, Fluzone MDV 11/23/2015    Pneumococcal Conjugate - 13 Valent 07/13/2017     "Pneumococcal Conjugate - 20 Valent 07/22/2022    Zoster Recombinant 06/02/2021, 08/02/2021        Review of Systems   All other systems reviewed and are negative.       Past Medical History:   Diagnosis Date    a Chronic Sinus Tachycardia 11/24/2023    Decreased His Testosterone Dose To See If This Resolves, Or May Need To Change Concerta To Focalin    a Family H/O Early CAD     6/2/21 RXd ASA 81 Mg Daily; 7/29/22 Cardiac CT Ca+ Score = 42.0 (See Report); His Brother Had An MI At Age 54    b Hypertension     10/19/17 RXd Guanfacine 1 Mg qAM And Decreased Losartan/HCTZ 100/25 Mg qAM To 1/2 Tab qAM; 5/5/17 Increased Losartan/HCTZ To 100/25 Mg qAM; 4/21/17 RXd Losartan/HCTZ 50/12.5 Mg qAM     c Hypercholesterolemia     His Goal LDL Is < 100 (Cardiac CT Ca+ Score); 8/5/22 RXd Crestor 10 Mg qHS With Labs In 6 Weeks    j Chronic Mildly Elevated ALT Levels     Am Monitoring    j Descending And Sigmoid Diverticulosis     Dr. Logan sanders GERD (gastroesophageal reflux disease)     j H/O Laparoscopic Cholecystectomy In 09/2018     Dr. Yuval sanders Irritable Bowel Syndrome     Dr. Logan Tsai Noted This During Patient's 6/29/16 TC: "Mild Colonic Spasm C/W IBS"    j Small Internal Hemorrhoids     Dr. Logan Tsai    k H/O Right Inguinal Hernia Repair In 2006     k Low Testosterone Levels ####    11/21/18 Decreased Testosterone To 100 Mg IM Every 2 Weeks; Dr. Valentín Augustin; On IM Testosterone Monthly; 10/7/16 Testosterone = 168 (195.0-1138.0)    l Bilateral Hand Arthritis     11/24/23 Referred To Dr. Israel Dominguez; 11/24/23 Rheumatologic Labs = All Normal    l Chronic Left Shoulder Pain     7/13/17 Referred To Dr. Bethel Ornelas    l H/O Right Shoulder SX In 2011     Dr. Barragan At [facility]    n Attention Deficit Disorder     11/24/23 His Is Now Doing Well On No Treatment For This; 7/22/22 RXd Vyvanse 30 Mg qAM With Toprol-XL 12.5-25 Mg Daily; 10/23/18 RXd Concerta 18 Mg qAM; 10/19/17 D/Cd Vyvanse And RXd Guanfacine " 1 Mg Daily (#90;Rx3); Wellbutrin- Mg Was Ineffective; Adderal-XR 20 Mg Increased His BP And HR; Vyvanse 50 Mg qAM Caused Increased BP; This DX Is Well Documented In His [facility] Epic EMR    n Depression     This Resolved On Concerta Alone; 10/23/18 RXd Concerta 18 Mg qAM, And Will See If This Is Benifitted By It    n Therapeutic Drug Monitoring     o Left Ear Basal Cell Carcinoma     Dr. Mcelroy (In [place]) Resected This In 09/2022 And Also Started Efudex Cream Topically    o Recurrent Sinus Infections 07/26/2024    Dr. Cristian Sinclair (ENT); 7/17/24 Maxillofacial Sinus CT Scan W/O Contrast = (Report Scanned)    Wellness Visit 11/24/2023      Past Surgical History:   Procedure Laterality Date    COLONOSCOPY N/A 6/29/2016    Procedure: COLONOSCOPY;  Surgeon: Logan Tsai Jr., MD;  Location: [facility] ENDO;  Service: Endoscopy;  Laterality: N/A;    FUNCTIONAL ENDOSCOPIC SINUS SURGERY (FESS) USING COMPUTER-ASSISTED NAVIGATION Bilateral 12/19/2024    Procedure: FESS, USING COMPUTER-ASSISTED NAVIGATION- [place];  Surgeon: Regulo Barker MD;  Location: [address];  Service: ENT;  Laterality: Bilateral;    HERNIA REPAIR Right 2006    groin    LAPAROSCOPIC CHOLECYSTECTOMY N/A 9/12/2018    Procedure: CHOLECYSTECTOMY, LAPAROSCOPIC;  Surgeon: Yuvla Cadena MD;  Location: [facility] OR;  Service: General;  Laterality: N/A;    SHOULDER SURGERY  2011    Right    TONSILLECTOMY       Family History   Problem Relation Name Age of Onset    Sleep apnea Brother      Heart disease Father  47    Hypertension Father      Cancer Mother          BREAST    Cataracts Paternal Grandmother      Glaucoma Paternal Grandmother      Glaucoma Paternal Grandfather      Amblyopia Neg Hx      Blindness Neg Hx      Diabetes Neg Hx      Macular degeneration Neg Hx      Retinal detachment Neg Hx      Strabismus Neg Hx      Stroke Neg Hx      Thyroid disease Neg Hx       Social History     Tobacco Use    Smoking status: Never     Passive exposure:  Never    Smokeless tobacco: Never   Substance Use Topics    Alcohol use: Yes     Alcohol/week: 0.8 standard drinks of alcohol     Types: 1 Standard drinks or equivalent per week     Comment: 1-2 / week    Drug use: No       Objective:     Physical Exam  Constitutional:       General: He is not in acute distress.     Appearance: Normal appearance. He is well-developed. He is not ill-appearing or diaphoretic.   HENT:      Head: Normocephalic and atraumatic.      Right Ear: External ear normal.      Left Ear: External ear normal.      Nose: Nose normal.   Eyes:      General: No scleral icterus.        Right eye: No discharge.         Left eye: No discharge.      Extraocular Movements: Extraocular movements intact.      Conjunctiva/sclera: Conjunctivae normal.   Pulmonary:      Effort: Pulmonary effort is normal. No respiratory distress.      Breath sounds: No stridor.   Skin:     General: Skin is dry.      Coloration: Skin is not jaundiced or pale.      Findings: No erythema.   Neurological:      General: No focal deficit present.      Mental Status: He is alert and oriented to person, place, and time. Mental status is at baseline.   Psychiatric:         Mood and Affect: Mood normal.         Behavior: Behavior normal.         Thought Content: Thought content normal.         Judgment: Judgment normal.         Data:    All data, including recent labs, radiology, and pathology, has been independently reviewed.    Labs:    Recent Labs   Lab Result Units 11/13/24  1810 12/23/24  1027 12/30/24  1048   WBC K/uL 18.48* 15.03* 10.43   Hemoglobin g/dL 15.5 13.2* 12.4*   Hematocrit % 46.0 39.0* 38.1*   Sodium mmol/L 134* 138 134*   Potassium mmol/L 4.0 3.7 4.0   Chloride mmol/L 97 104 104   BUN mg/dL 20 25* 16   Creatinine mg/dL 1.13 1.0 0.8   AST U/L 40 21 29   ALT U/L 41 21 25   Alkaline Phosphatase U/L 87 82 88   Total Bilirubin mg/dL 0.4 0.4 0.4   CRP mg/L  --   --  2.4        Radiology:    No results found in the last 24  hours.     Assessment:    1. Frontal bone osteomyelitis with subperiosteal abscess  Continue meropenem  Patient is planning to travel this weekend, will provide prescription for PO abx while he is traveling. He will continue line care while travelling, and resume IV meropenem when he returns.   Tentative EOT is 1/31/25. At that time, will determine if there is a role for long course of PO abx in setting of chronic osteo.     levoFLOXacin (LEVAQUIN) 750 MG tablet           Follow up in 1 month    The total time for evaluation and management services performed on 1/2/25 was greater than 40 minutes.     Visit today included increased complexity associated with the care of the episodic problem addressed and managing the longitudinal care of the patient due to the serious and/or complex managed problem(s).    Karen Castano DO  Infectious Disease

## 2025-01-03 ENCOUNTER — TELEPHONE (OUTPATIENT)
Dept: OTOLARYNGOLOGY | Facility: CLINIC | Age: 60
End: 2025-01-03
Payer: COMMERCIAL

## 2025-01-17 ENCOUNTER — OFFICE VISIT (OUTPATIENT)
Dept: OTOLARYNGOLOGY | Facility: CLINIC | Age: 60
End: 2025-01-17
Payer: COMMERCIAL

## 2025-01-17 VITALS
HEART RATE: 82 BPM | WEIGHT: 210.31 LBS | DIASTOLIC BLOOD PRESSURE: 89 MMHG | BODY MASS INDEX: 33.95 KG/M2 | SYSTOLIC BLOOD PRESSURE: 142 MMHG

## 2025-01-17 DIAGNOSIS — M86.8X8 POTT'S PUFFY TUMOR (FRONTAL BONE OSTEOMYELITIS WITH SUBPERIOSTEAL ABSCESS): Primary | ICD-10-CM

## 2025-01-17 DIAGNOSIS — J34.2 NASAL SEPTAL DEVIATION: ICD-10-CM

## 2025-01-17 DIAGNOSIS — J32.1 CHRONIC FRONTAL SINUSITIS: ICD-10-CM

## 2025-01-17 PROCEDURE — 3077F SYST BP >= 140 MM HG: CPT | Mod: CPTII,S$GLB,, | Performed by: STUDENT IN AN ORGANIZED HEALTH CARE EDUCATION/TRAINING PROGRAM

## 2025-01-17 PROCEDURE — 31237 NSL/SINS NDSC SURG BX POLYPC: CPT | Mod: 50,S$GLB,, | Performed by: STUDENT IN AN ORGANIZED HEALTH CARE EDUCATION/TRAINING PROGRAM

## 2025-01-17 PROCEDURE — 99999 PR PBB SHADOW E&M-EST. PATIENT-LVL IV: CPT | Mod: PBBFAC,,, | Performed by: STUDENT IN AN ORGANIZED HEALTH CARE EDUCATION/TRAINING PROGRAM

## 2025-01-17 PROCEDURE — 1159F MED LIST DOCD IN RCRD: CPT | Mod: CPTII,S$GLB,, | Performed by: STUDENT IN AN ORGANIZED HEALTH CARE EDUCATION/TRAINING PROGRAM

## 2025-01-17 PROCEDURE — 3079F DIAST BP 80-89 MM HG: CPT | Mod: CPTII,S$GLB,, | Performed by: STUDENT IN AN ORGANIZED HEALTH CARE EDUCATION/TRAINING PROGRAM

## 2025-01-17 PROCEDURE — 1160F RVW MEDS BY RX/DR IN RCRD: CPT | Mod: CPTII,S$GLB,, | Performed by: STUDENT IN AN ORGANIZED HEALTH CARE EDUCATION/TRAINING PROGRAM

## 2025-01-17 PROCEDURE — 99024 POSTOP FOLLOW-UP VISIT: CPT | Mod: S$GLB,,, | Performed by: STUDENT IN AN ORGANIZED HEALTH CARE EDUCATION/TRAINING PROGRAM

## 2025-01-21 NOTE — PROGRESS NOTES
Subjective:      Lucien is a 59 y.o. male who comes for follow-up of chronic frontal sinusitis. He is s/p ESS with FDO on 12/27/24. Cultures still not completely fine lines from fungal standpoint.  He has few weeks left of his IV meropenem.  Reports continued improvement in facial swelling.  Little to no drainage coming through his nose.    His current sinus regime consists of: Saline irrigations.     The assessment of quality and severity of symptoms as measured by the      The patient's medications, allergies, past medical, surgical, social and family histories were reviewed and updated as appropriate.    Review of Systems   Constitutional:  Positive for malaise/fatigue.   Eyes: Negative.    Cardiovascular: Negative.    Gastrointestinal: Negative.    Genitourinary: Negative.    Musculoskeletal: Negative.    Skin: Negative.    Neurological: Negative.    Psychiatric/Behavioral: Negative.       A detailed review of systems was obtained with pertinent positives as per the above HPI, and otherwise negative.        Objective:     BP (!) 142/89 (BP Location: Left arm, Patient Position: Sitting)   Pulse 82   Wt 95.4 kg (210 lb 5.1 oz)   BMI 33.95 kg/m²        Constitutional:   Vital signs are normal. He appears well-developed and well-nourished.     Head:  Normocephalic and atraumatic.         Ears:  Hearing normal to normal and whispered voice; external ear normal without scars, lesions, or masses; ear canal, tympanic membrane, and middle ear normal..   Right Ear: No swelling. Tympanic membrane is not perforated and not bulging. No middle ear effusion.   Left Ear: No swelling. Tympanic membrane is not perforated and not bulging.  No middle ear effusion.     Nose:  Nose normal including turbinates, nasal mucosa, sinuses and nasal septum. No epistaxis.     Mouth/Throat  Oropharynx clear and moist without lesions or asymmetry and normal uvula midline. Normal dentition. No tonsillar abscesses. Tonsillar exudate.       Neck:  Neck normal without thyromegaly masses, asymmetry, normal tracheal structure, crepitus, and tenderness, thyroid normal, trachea normal, phonation normal, full range of motion with neck supple and no adenopathy. No stridor present.        Head (right side): No submental adenopathy present.        Head (left side): No submental adenopathy present.     He has no cervical adenopathy.     Pulmonary/Chest:   No stridor.       Procedure    Nasal endoscopy with debridement performed.  See procedure note.    Nasal Endoscopy with Debridement, Bilateral.     Nasal endoscopy and debridement were performed in accordance with the 0 day global period for endoscopic sinus surgery and are unrelated to any other recently performed procedures.     Topical Agents: Topical 0.25% phenylephrine and 4% lidocaine    A 0 degree rigid nasal endoscope was inserted into the nose to visualize the nasal passageway and paranasal sinuses. Under endoscopic visualization, a combination of instruments including suction and grasping forceps were used to debride crust, debris, inflammatory tissue and nasal polyps from the nasal cavity, paranasal sinuses and sinus drainage pathways. This was performed to aid in healing and optimize the patency and function of the sinus cavities and nasal passageways. This is necessary to avoid scar formation, infection, and mucocele formation. In addition, this facilitates in the optimal instillation of topical therapies, saline irrigations, long-term disease surveillance, and endoscopically-derived cultures. The endoscope was withdrawn without sequelae. The procedure was well tolerated by the patient.    Sinuses continued to heal nicely.  He has patent frontal sinuses bilaterally.  Debris at all blood clots removed from bilateral middle meatus and maxillary sinuses.    Data Reviewed    WBC (K/uL)   Date Value   01/15/2025 7.09     Eosinophil % (%)   Date Value   01/15/2025 3.2     Eos # (K/uL)   Date Value    01/15/2025 0.2     Platelets (K/uL)   Date Value   01/15/2025 264     Glucose (mg/dL)   Date Value   01/15/2025 88     Total IgE (IU/mL)   Date Value   11/25/2024 60       No sinus imaging available.      Assessment:     1. 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 Chronic frontal sinusitis    3. Nasal septal deviation           Plan:     - Cont IV brisa  - RTC 4 weeks for debridement.     Regulo Barker MD

## 2025-01-31 ENCOUNTER — OFFICE VISIT (OUTPATIENT)
Dept: INFECTIOUS DISEASES | Facility: CLINIC | Age: 60
End: 2025-01-31
Payer: COMMERCIAL

## 2025-01-31 VITALS
TEMPERATURE: 99 F | HEART RATE: 65 BPM | SYSTOLIC BLOOD PRESSURE: 149 MMHG | BODY MASS INDEX: 33.94 KG/M2 | HEIGHT: 66 IN | WEIGHT: 211.19 LBS | DIASTOLIC BLOOD PRESSURE: 89 MMHG

## 2025-01-31 DIAGNOSIS — M86.8X8: Primary | ICD-10-CM

## 2025-01-31 PROCEDURE — 1159F MED LIST DOCD IN RCRD: CPT | Mod: CPTII,S$GLB,, | Performed by: INTERNAL MEDICINE

## 2025-01-31 PROCEDURE — 4010F ACE/ARB THERAPY RXD/TAKEN: CPT | Mod: CPTII,S$GLB,, | Performed by: INTERNAL MEDICINE

## 2025-01-31 PROCEDURE — 3077F SYST BP >= 140 MM HG: CPT | Mod: CPTII,S$GLB,, | Performed by: INTERNAL MEDICINE

## 2025-01-31 PROCEDURE — 3079F DIAST BP 80-89 MM HG: CPT | Mod: CPTII,S$GLB,, | Performed by: INTERNAL MEDICINE

## 2025-01-31 PROCEDURE — 99999 PR PBB SHADOW E&M-EST. PATIENT-LVL IV: CPT | Mod: PBBFAC,,, | Performed by: INTERNAL MEDICINE

## 2025-01-31 PROCEDURE — 99214 OFFICE O/P EST MOD 30 MIN: CPT | Mod: S$GLB,,, | Performed by: INTERNAL MEDICINE

## 2025-01-31 PROCEDURE — 3008F BODY MASS INDEX DOCD: CPT | Mod: CPTII,S$GLB,, | Performed by: INTERNAL MEDICINE

## 2025-01-31 NOTE — PROGRESS NOTES
Subjective:     Patient ID: Lucien Linares is a 59 y.o. male    Chief Complaint: frontal sinusitis and osteomyelitis    HPI: 59M who is referred to ID for chronic sinus infections and swelling of L side of his forehead. He does not have significant congestion, drainage, pain or headaches. He notes history of prior sinus surgery. Recent imaging with complete opacification of L frontal and anterior ethmoid air cells w/ bony erosion through L frontal sinus, and additional scattered sinus disease in paranasal and R maxillary sinuses. ENT is planning on FESS on 12/19. He has been referred to ID for antibiotic management. Pt is not currently on antibiotics. Recent nares cx from 11/29 positive only for prevotella.      1/2/25  Patient is s/p FESS  on 12/19/24 with bilateral maxillary antrostomy, bilateral ethmoidectomy, bilateral sphenoidotomy, bilateral frontal sinusotomy. Operative cultures are negative. On discussion w/ ENT, operative findings included 2 areas of significant purulence with differing appearances. Pathology specimens noting chronic osteomyelitis and inflammatory change. Per discussion w/ ENT, pt still has residual infected bone that has not been resected. Patient is currently on a course of IV meropenem with tentative plans to complete 6 weeks post-op (EOC 1/31/25).  No issues w/ antibiotic or PICC line. He notes he has felt fatigued post-operatively, but is improving.     1/31/25  Patient seen for follow up today, overall feeling very well. Frontal sinus swelling is almost completely resolved, endorses minimal congestion. End date of meropenem is today.     Immunization History   Administered Date(s) Administered    COVID-19 MRNA, LN-S PF (MODERNA HALF 0.25 ML DOSE) 12/13/2021    COVID-19, MRNA, LN-S, PF (MODERNA FULL 0.5 ML DOSE) 02/22/2021, 03/23/2021    Influenza 08/27/2022    Influenza - Quadrivalent - MDCK - PF 10/06/2017, 08/20/2022, 11/01/2023    Influenza - Quadrivalent - PF *Preferred* (6  "months and older) 11/23/2015, 09/22/2016, 10/06/2017, 10/23/2018, 09/26/2021    Influenza - Trivalent - Afluria, Fluzone MDV 11/23/2015    Pneumococcal Conjugate - 13 Valent 07/13/2017    Pneumococcal Conjugate - 20 Valent 07/22/2022    Zoster Recombinant 06/02/2021, 08/02/2021      Review of Systems   All other systems reviewed and are negative.       Past Medical History:   Diagnosis Date    a Chronic Sinus Tachycardia 11/24/2023    Decreased His Testosterone Dose To See If This Resolves, Or May Need To Change Concerta To Focalin    a Family H/O Early CAD     6/2/21 RXd ASA 81 Mg Daily; 7/29/22 Cardiac CT Ca+ Score = 42.0 (See Report); His Brother Had An MI At Age 54    b Hypertension     10/19/17 RXd Guanfacine 1 Mg qAM And Decreased Losartan/HCTZ 100/25 Mg qAM To 1/2 Tab qAM; 5/5/17 Increased Losartan/HCTZ To 100/25 Mg qAM; 4/21/17 RXd Losartan/HCTZ 50/12.5 Mg qAM     c Hypercholesterolemia     His Goal LDL Is < 100 (Cardiac CT Ca+ Score); 8/5/22 RXd Crestor 10 Mg qHS With Labs In 6 Weeks    j Chronic Mildly Elevated ALT Levels     Am Monitoring    j Descending And Sigmoid Diverticulosis     Dr. Logan Tsai    j GERD (gastroesophageal reflux disease)     j H/O Laparoscopic Cholecystectomy In 09/2018     Dr. Yuval sanders Irritable Bowel Syndrome     Dr. Logan Tsai Noted This During Patient's 6/29/16 TC: "Mild Colonic Spasm C/W IBS"    j Small Internal Hemorrhoids     Dr. Logan Tsai    k H/O Right Inguinal Hernia Repair In 2006     k Low Testosterone Levels ####    11/21/18 Decreased Testosterone To 100 Mg IM Every 2 Weeks; Dr. Valentín Augustin; On IM Testosterone Monthly; 10/7/16 Testosterone = 168 (195.0-1138.0)    l Bilateral Hand Arthritis     11/24/23 Referred To Dr. Israel Dominguez; 11/24/23 Rheumatologic Labs = All Normal    l Chronic Left Shoulder Pain     7/13/17 Referred To Dr. Bethel Ornelas    l H/O Right Shoulder SX In 2011     Dr. Barragan At [facility]    n Attention Deficit Disorder     11/24/23 " His Is Now Doing Well On No Treatment For This; 7/22/22 RXd Vyvanse 30 Mg qAM With Toprol-XL 12.5-25 Mg Daily; 10/23/18 RXd Concerta 18 Mg qAM; 10/19/17 D/Cd Vyvanse And RXd Guanfacine 1 Mg Daily (#90;Rx3); Wellbutrin- Mg Was Ineffective; Adderal-XR 20 Mg Increased His BP And HR; Vyvanse 50 Mg qAM Caused Increased BP; This DX Is Well Documented In His [facility] Epic EMR    n Depression     This Resolved On Concerta Alone; 10/23/18 RXd Concerta 18 Mg qAM, And Will See If This Is Benifitted By It    n Therapeutic Drug Monitoring     o Left Ear Basal Cell Carcinoma     Dr. Mcelroy (In [place]) Resected This In 09/2022 And Also Started Efudex Cream Topically    o Recurrent Sinus Infections 07/26/2024    Dr. Cristian Sinclair (ENT); 7/17/24 Maxillofacial Sinus CT Scan W/O Contrast = (Report Scanned)    Wellness Visit 11/24/2023      Past Surgical History:   Procedure Laterality Date    COLONOSCOPY N/A 6/29/2016    Procedure: COLONOSCOPY;  Surgeon: Logan Tsai Jr., MD;  Location: [facility];  Service: Endoscopy;  Laterality: N/A;    FUNCTIONAL ENDOSCOPIC SINUS SURGERY (FESS) USING COMPUTER-ASSISTED NAVIGATION Bilateral 12/19/2024    Procedure: FESS, USING COMPUTER-ASSISTED NAVIGATION- Conjectur;  Surgeon: Regulo Barker MD;  Location: [address];  Service: ENT;  Laterality: Bilateral;    HERNIA REPAIR Right 2006    groin    LAPAROSCOPIC CHOLECYSTECTOMY N/A 9/12/2018    Procedure: CHOLECYSTECTOMY, LAPAROSCOPIC;  Surgeon: Yuval Cadena MD;  Location: [facility] OR;  Service: General;  Laterality: N/A;    SHOULDER SURGERY  2011    Right    TONSILLECTOMY       Family History   Problem Relation Name Age of Onset    Sleep apnea Brother      Heart disease Father  47    Hypertension Father      Cancer Mother          BREAST    Cataracts Paternal Grandmother      Glaucoma Paternal Grandmother      Glaucoma Paternal Grandfather      Amblyopia Neg Hx      Blindness Neg Hx      Diabetes Neg Hx      Macular degeneration Neg Hx       Retinal detachment Neg Hx      Strabismus Neg Hx      Stroke Neg Hx      Thyroid disease Neg Hx       Social History     Tobacco Use    Smoking status: Never     Passive exposure: Never    Smokeless tobacco: Never   Substance Use Topics    Alcohol use: Yes     Alcohol/week: 0.8 standard drinks of alcohol     Types: 1 Standard drinks or equivalent per week     Comment: 1-2 / week    Drug use: No       Objective:     Physical Exam  Constitutional:       General: He is not in acute distress.     Appearance: Normal appearance. He is well-developed. He is not ill-appearing or diaphoretic.   HENT:      Head: Normocephalic and atraumatic.      Right Ear: External ear normal.      Left Ear: External ear normal.      Nose: Nose normal.   Eyes:      General: No scleral icterus.        Right eye: No discharge.         Left eye: No discharge.      Extraocular Movements: Extraocular movements intact.      Conjunctiva/sclera: Conjunctivae normal.   Pulmonary:      Effort: Pulmonary effort is normal. No respiratory distress.      Breath sounds: No stridor.   Skin:     General: Skin is dry.      Coloration: Skin is not jaundiced or pale.      Findings: No erythema.   Neurological:      General: No focal deficit present.      Mental Status: He is alert and oriented to person, place, and time. Mental status is at baseline.   Psychiatric:         Mood and Affect: Mood normal.         Behavior: Behavior normal.         Thought Content: Thought content normal.         Judgment: Judgment normal.         Data:    All data, including recent labs, radiology, and pathology, has been independently reviewed.    Labs:    Recent Labs   Lab Result Units 01/08/25  1615 01/15/25  1610 01/24/25  1531   WBC K/uL 6.95 7.09 6.59   Hemoglobin g/dL 12.5* 12.7* 13.4*   Hematocrit % 38.7* 37.7* 39.6*   Sodium mmol/L 135* 137 135*   Potassium mmol/L 3.3* 3.7 3.9   Chloride mmol/L 99 101 99   BUN mg/dL 17 12 15   Creatinine mg/dL 1.1 0.8 0.8   AST U/L  29 35 42*   ALT U/L 30 38 51*   Alkaline Phosphatase U/L 98 95 102   Total Bilirubin mg/dL 0.3 0.4 0.3   CRP mg/L 7.2 5.2 5.7        Radiology:    No results found in the last 24 hours.     Assessment:    1. Frontal bone osteomyelitis with subperiosteal abscess  Completing 6 week course of meropenem today  Will stop abx and removal line  Discussed w/ ENT - will plan to monitor off antibiotics  Follow up in 1 month    PICC line removal           Follow up in 1 month    The total time for evaluation and management services performed on 1/31/25 was greater than 30 minutes.     Karen Ferrario DO  Infectious Disease

## 2025-02-24 ENCOUNTER — PATIENT MESSAGE (OUTPATIENT)
Dept: OTOLARYNGOLOGY | Facility: CLINIC | Age: 60
End: 2025-02-24
Payer: COMMERCIAL

## 2025-03-03 NOTE — PROGRESS NOTES
RHEUMATOLOGY OUTPATIENT CLINIC NOTE    5/28/2024    Attending Rheumatologist: Jan Hanks  Primary Care Provider: Devendra Vieyra MD   Physician Requesting Consultation: Israel Dominguez MD  [address] [place]  [place] [address]  Chief Complaint/Reason For Consultation:  elevated CRP     Subjective:       HPI  Lucien Linares is a 58 y.o. White male with pmhx noted below referred for hand arthritis and elevated CRP.   Patient reports deformities at DIP of multiple fingers in bilateral hands. There is tenderness in the area mostly with activity. Patient reports difficulty making a fist.   5-6 yrs ago started worsening  Mother had exact same thing.  Stiffness in prolonged in AM  L hand worse with nodules in DIP-- fine motor is a issue  Smoker- no  Pso- No  Takes Meloxicam - helps to take edge off by end of the day    Review of Systems   All other systems reviewed and are negative.       Chronic comorbid conditions affecting medical decision making today:  Past Medical History:   Diagnosis Date    a Chronic Sinus Tachycardia 11/24/2023    Decreased His Testosterone Dose To See If This Resolves, Or May Need To Change Concerta To Focalin    a Family H/O Early CAD     6/2/21 RXd ASA 81 Mg Daily; 7/29/22 Cardiac CT Ca+ Score = 42.0 (See Report); His Brother Had An MI At Age 54    b Hypertension     10/19/17 RXd Guanfacine 1 Mg qAM And Decreased Losartan/HCTZ 100/25 Mg qAM To 1/2 Tab qAM; 5/5/17 Increased Losartan/HCTZ To 100/25 Mg qAM; 4/21/17 RXd Losartan/HCTZ 50/12.5 Mg qAM     c Hypercholesterolemia     His Goal LDL Is < 100 (Cardiac CT Ca+ Score); 8/5/22 RXd Crestor 10 Mg qHS With Labs In 6 Weeks    j Chronic Mildly Elevated ALT Levels     Am Monitoring    j Descending And Sigmoid Diverticulosis     Dr. Logan sanders GERD (gastroesophageal reflux disease)     j H/O Laparoscopic Cholecystectomy In 09/2018     Dr. Yuval sanders Irritable Bowel Syndrome     Dr. Logan Tsai Noted This  "During Patient's 6/29/16 TC: "Mild Colonic Spasm C/W IBS"    j Small Internal Hemorrhoids     Dr. Logan Tsai    k H/O Right Inguinal Hernia Repair In 2006     k Low Testosterone Levels ####    11/21/18 Decreased Testosterone To 100 Mg IM Every 2 Weeks; Dr. Valentín Augustin; On IM Testosterone Monthly; 10/7/16 Testosterone = 168 (195.0-1138.0)    l Bilateral Hand Arthritis     11/24/23 Referred To Dr. Israel Dominguez; 11/24/23 Rheumatologic Labs = All Normal    l Chronic Left Shoulder Pain     7/13/17 Referred To Dr. Bethel morton H/O Right Shoulder SX In 2011     Dr. Barragan At [facility]    n Attention Deficit Disorder     11/24/23 His Is Now Doing Well On No Treatment For This; 7/22/22 RXd Vyvanse 30 Mg qAM With Toprol-XL 12.5-25 Mg Daily; 10/23/18 RXd Concerta 18 Mg qAM; 10/19/17 D/Cd Vyvanse And RXd Guanfacine 1 Mg Daily (#90;Rx3); Wellbutrin- Mg Was Ineffective; Adderal-XR 20 Mg Increased His BP And HR; Vyvanse 50 Mg qAM Caused Increased BP; This DX Is Well Documented In His [facility] Epic EMR    n Depression     This Resolved On Concerta Alone; 10/23/18 RXd Concerta 18 Mg qAM, And Will See If This Is Benifitted By It    n Therapeutic Drug Monitoring     o Left Ear Basal Cell Carcinoma     Dr. Mcelroy (In [place]) Resected This In 09/2022 And Also Started Efudex Cream Topically    Wellness Visit 11/24/2023      Past Surgical History:   Procedure Laterality Date    COLONOSCOPY N/A 6/29/2016    Procedure: COLONOSCOPY;  Surgeon: Logan Tsai Jr., MD;  Location: [facility] ENDO;  Service: Endoscopy;  Laterality: N/A;    HERNIA REPAIR Right 2006    groin    LAPAROSCOPIC CHOLECYSTECTOMY N/A 9/12/2018    Procedure: CHOLECYSTECTOMY, LAPAROSCOPIC;  Surgeon: Yuval Cadena MD;  Location: [facility] OR;  Service: General;  Laterality: N/A;    SHOULDER SURGERY  2011    Right    TONSILLECTOMY       Family History   Problem Relation Name Age of Onset    Sleep apnea Brother      Heart disease Father  47    Hypertension Father  "     Cancer Mother          BREAST    Cataracts Paternal Grandmother      Glaucoma Paternal Grandmother      Glaucoma Paternal Grandfather      Amblyopia Neg Hx      Blindness Neg Hx      Diabetes Neg Hx      Macular degeneration Neg Hx      Retinal detachment Neg Hx      Strabismus Neg Hx      Stroke Neg Hx      Thyroid disease Neg Hx       Social History     Substance and Sexual Activity   Alcohol Use Yes    Alcohol/week: 0.8 standard drinks of alcohol    Types: 1 Standard drinks or equivalent per week    Comment: 1-2 / week     Social History     Tobacco Use   Smoking Status Never    Passive exposure: Never   Smokeless Tobacco Never     Social History     Substance and Sexual Activity   Drug Use No       Current Outpatient Medications:     acetaminophen (TYLENOL) 650 MG TbSR, Take 1 tablet (650 mg total) by mouth 2 (two) times daily as needed (for mild to moderate pain)., Disp: , Rfl: 0    cetirizine (ZYRTEC) 10 MG tablet, Take 1 tablet (10 mg total) by mouth every evening., Disp: 90 tablet, Rfl: 3    ergocalciferol, vitamin D2, (VITAMIN D ORAL), Take by mouth., Disp: , Rfl:     fluticasone propionate (FLONASE) 50 mcg/actuation nasal spray, 1 spray (50 mcg total) by Each Nostril route once daily., Disp: 16 g, Rfl: 3    hydroCHLOROthiazide (MICROZIDE) 12.5 mg capsule, TAKE 1 CAPSULE(12.5 MG) BY MOUTH EVERY MORNING, Disp: 90 capsule, Rfl: 3    loratadine (CLARITIN) 10 mg tablet, Take 10 mg by mouth daily as needed for Allergies., Disp: , Rfl:     losartan (COZAAR) 50 MG tablet, TAKE 1 TABLET(50 MG) BY MOUTH EVERY MORNING, Disp: 90 tablet, Rfl: 3    meloxicam (MOBIC) 15 MG tablet, TAKE 1 TABLET(15 MG) BY MOUTH DAILY AS NEEDED FOR PAIN, Disp: 90 tablet, Rfl: 1    multivitamin capsule, Take 1 capsule by mouth once daily., Disp: , Rfl:     rosuvastatin (CRESTOR) 10 MG tablet, TAKE 1 TABLET(10 MG) BY MOUTH EVERY EVENING, Disp: 90 tablet, Rfl: 3    testosterone cypionate (DEPOTESTOTERONE CYPIONATE) 200 mg/mL injection,  INJECT 1 ML IN MUSCLE EVERY 14 DAYS, Disp: 6 mL, Rfl: 3    aspirin (ECOTRIN) 81 MG EC tablet, Take 1 tablet (81 mg total) by mouth once daily., Disp: , Rfl: 0    hydroxychloroquine (PLAQUENIL) 200 mg tablet, Take 2 tablets (400 mg total) by mouth once daily., Disp: 60 tablet, Rfl: 11    methylPREDNISolone (MEDROL DOSEPACK) 4 mg tablet, use as directed, Disp: 21 each, Rfl: 0    metoprolol succinate (TOPROL-XL) 25 MG 24 hr tablet, Take 0.5-1 tablets (12.5-25 mg total) by mouth once daily., Disp: 90 tablet, Rfl: 3     Objective:         Vitals:    05/28/24 1216   BP: 117/77   Pulse: 84   Resp: 18   Temp: 98.8 °F (37.1 °C)     Physical Exam   Constitutional: normal appearance.   HENT:   Head: Normocephalic and atraumatic.   Nose: Nose normal.   Mouth/Throat: Mucous membranes are moist.   Eyes: Pupils are equal, round, and reactive to light.   Cardiovascular: Normal rate.   Pulmonary/Chest: Effort normal.   Musculoskeletal:         General: Tenderness present. No swelling.      Cervical back: Normal range of motion.      Comments: Bilateral heberden and arthur nodes   Bilateral PIP and DIP tenderness    Neurological: He is alert.       Reviewed old and all outside pertinent medical records available.    All lab results personally reviewed and interpreted by me.  Lab Results   Component Value Date    WBC 9.17 11/25/2023    HGB 14.1 11/25/2023    HCT 42.7 11/25/2023    MCV 91 11/25/2023    MCH 29.9 11/25/2023    MCHC 33.0 11/25/2023    RDW 12.5 11/25/2023     11/25/2023    MPV 9.4 11/25/2023       Lab Results   Component Value Date     11/25/2023    K 4.5 11/25/2023    CL 96 11/25/2023    CO2 28 11/25/2023     11/25/2023    BUN 21 11/25/2023    CALCIUM 9.6 11/25/2023    PROT 7.2 11/25/2023    ALBUMIN 4.4 11/25/2023    BILITOT 0.6 11/25/2023    AST 38 11/25/2023    ALKPHOS 90 11/25/2023    ALT 32 11/25/2023       Lab Results   Component Value Date    COLORU YELLOW 12/20/2011    APPEARANCEUA CLEAR  12/20/2011    SPECGRAV 1.025 12/20/2011    PHUR 5.5 12/20/2011    PROTEINUA Negative 12/20/2011    KETONESU Negative 12/20/2011    LEUKOCYTESUR Negative 12/20/2011    NITRITE Negative 12/20/2011       Lab Results   Component Value Date    CRP 30.0 (H) 12/22/2023       Lab Results   Component Value Date    SEDRATE 8 12/22/2023       Lab Results   Component Value Date    RF <8.6 11/25/2023    SEDRATE 8 12/22/2023           Lab Results   Component Value Date    URICACID 6.1 11/25/2023       Imaging:  All imaging reviewed and independently interpreted by me.  EXAMINATION:  XR HAND COMPLETE 3 VIEWS BILATERAL     CLINICAL HISTORY:  . Primary osteoarthritis, unspecified hand     TECHNIQUE:  PA, lateral, and oblique views of both hands were performed.     COMPARISON:  None     FINDINGS:  Left: Irregularity to the distal interphalangeal joints of the 2nd 3rd and 4th digits is noted with seagull type deformity and osseous hypertrophy.  Please correlate for erosive osteoarthropathy or inflammatory arthropathy such as psoriasis.  No obvious fractures or dislocations are noted.  Well corticated calcification is noted at the 1st metacarpal carpal articulation of 2 mm suggestive of prior chip injury or ligamentous injury.  Mild interphalangeal joint space loss is noted diffusely as can be seen with degenerative change     Right: No obvious fracture or dislocation is noted.  Osseous hypertrophy and periarticular erosive changes noted at the distal interphalangeal joint of the 2nd and 3rd digits.  Please correlate for erosive osteoarthropathy or inflammatory arthropathy such as psoriasis.  Mild interphalangeal joint space loss may be noted diffusely suggesting degenerative change.     Impression:     See above        Electronically signed by:Morales Groves MD  Date:                                            12/22/2023  Time:                                           10:02       ASSESSMENT / PLAN:     Lucien Linares is a 58 y.o.  White male with:      Inflammatory arthritis of both hands - ? Seronegative RA   Erosive OA of bilateral hand    - elevated CRP, bilateral hand stiffness and pain with swelling and xray showing periarticular erosion in one of the digits- there is a type of inflammatory arthritis but that is not the only arthritis happening he also has gull-wing deformity on xrays which suggest erosive OA which could be inflammatory in nature.   - Will start HCQ and medrol pack to see how joints behave  - will reassess next appt if not improvement then will order MRI       2. Elevated C-reactive protein (CRP)  - Most likely 2.2 to erosive OA or seronegative RA   - will repeat next appt        3. Other specified counseling  - over 10 minutes spent regarding below topics:  - Immunization counseling done.  - Weight loss counseling done.  - Nutrition and exercise counseling.  - Limitation of alcohol consumption.  - Regular exercise:  Aerobic and resistance.  - Medication counseling provided.    4. Obesity  - would benefit from decreasing at least 10% of body weight.  - recommended goal of losing 1 lb per week.    Follow up in about 3 months (around 8/28/2024).    Method of contact with patient concerns: [facility]    Disclaimer:  This note is prepared using voice recognition software and as such is likely to have errors and has not been proof read. Please contact me for questions.     Time spent: 45 minutes in face to face discussion concerning diagnosis, prognosis, review of lab and test results, benefits of treatment as well as management of disease, counseling of patient and coordination of care between various health care providers.  Greater than half the time spent was used for coordination of care and counseling of patient.    Jan Hanks M.D.  Rheumatology Department   [facility]      n/a

## 2025-03-06 ENCOUNTER — OFFICE VISIT (OUTPATIENT)
Dept: INFECTIOUS DISEASES | Facility: CLINIC | Age: 60
End: 2025-03-06
Payer: COMMERCIAL

## 2025-03-06 VITALS
HEART RATE: 97 BPM | TEMPERATURE: 98 F | DIASTOLIC BLOOD PRESSURE: 87 MMHG | BODY MASS INDEX: 34.65 KG/M2 | HEIGHT: 66 IN | WEIGHT: 215.63 LBS | SYSTOLIC BLOOD PRESSURE: 145 MMHG

## 2025-03-06 DIAGNOSIS — J32.9 SINUSITIS, UNSPECIFIED CHRONICITY, UNSPECIFIED LOCATION: Primary | ICD-10-CM

## 2025-03-06 PROCEDURE — 99999 PR PBB SHADOW E&M-EST. PATIENT-LVL IV: CPT | Mod: PBBFAC,,, | Performed by: INTERNAL MEDICINE

## 2025-03-06 NOTE — PROGRESS NOTES
Subjective:     Patient ID: Lucien Linares is a 59 y.o. male    Chief Complaint: sinusitis    HPI:  59M who is referred to ID for chronic sinus infections and swelling of L side of his forehead. He does not have significant congestion, drainage, pain or headaches. He notes history of prior sinus surgery. Recent imaging with complete opacification of L frontal and anterior ethmoid air cells w/ bony erosion through L frontal sinus, and additional scattered sinus disease in paranasal and R maxillary sinuses. ENT is planning on FESS on 12/19. He has been referred to ID for antibiotic management. Pt is not currently on antibiotics. Recent nares cx from 11/29 positive only for prevotella.      1/2/25  Patient is s/p FESS  on 12/19/24 with bilateral maxillary antrostomy, bilateral ethmoidectomy, bilateral sphenoidotomy, bilateral frontal sinusotomy. Operative cultures are negative. On discussion w/ ENT, operative findings included 2 areas of significant purulence with differing appearances. Pathology specimens noting chronic osteomyelitis and inflammatory change. Per discussion w/ ENT, pt still has residual infected bone that has not been resected. Patient is currently on a course of IV meropenem with tentative plans to complete 6 weeks post-op (EOC 1/31/25).  No issues w/ antibiotic or PICC line. He notes he has felt fatigued post-operatively, but is improving.      1/31/25  Patient seen for follow up today, overall feeling very well. Frontal sinus swelling is almost completely resolved, endorses minimal congestion. End date of meropenem is today.     3/6/25  Patient seen today for follow up. He is doing well. Endorses ongoing use of sinus flushes that help with his breathing, notes that drainage is mostly post-nasal drip. No sinus pain. Does mention that the L side of his scalp feels numb since the surgery. Feels like his energy levels have improved, recently enjoyed a trip to [place].    Immunization History  "  Administered Date(s) Administered    COVID-19 MRNA, LN-S PF (MODERNA HALF 0.25 ML DOSE) 12/13/2021    COVID-19, MRNA, LN-S, PF (MODERNA FULL 0.5 ML DOSE) 02/22/2021, 03/23/2021    Influenza 08/27/2022    Influenza - Quadrivalent - MDCK - PF 10/06/2017, 08/20/2022, 11/01/2023    Influenza - Quadrivalent - PF *Preferred* (6 months and older) 11/23/2015, 09/22/2016, 10/06/2017, 10/23/2018, 09/26/2021    Influenza - Trivalent - Afluria, Fluzone MDV 11/23/2015    Pneumococcal Conjugate - 13 Valent 07/13/2017    Pneumococcal Conjugate - 20 Valent 07/22/2022    Zoster Recombinant 06/02/2021, 08/02/2021        Review of Systems   All other systems reviewed and are negative.       Past Medical History:   Diagnosis Date    a Chronic Sinus Tachycardia 11/24/2023    Decreased His Testosterone Dose To See If This Resolves, Or May Need To Change Concerta To Focalin    a Family H/O Early CAD     6/2/21 RXd ASA 81 Mg Daily; 7/29/22 Cardiac CT Ca+ Score = 42.0 (See Report); His Brother Had An MI At Age 54    b Hypertension     10/19/17 RXd Guanfacine 1 Mg qAM And Decreased Losartan/HCTZ 100/25 Mg qAM To 1/2 Tab qAM; 5/5/17 Increased Losartan/HCTZ To 100/25 Mg qAM; 4/21/17 RXd Losartan/HCTZ 50/12.5 Mg qAM     c Hypercholesterolemia     His Goal LDL Is < 100 (Cardiac CT Ca+ Score); 8/5/22 RXd Crestor 10 Mg qHS With Labs In 6 Weeks    j Chronic Mildly Elevated ALT Levels     Am Monitoring    j Descending And Sigmoid Diverticulosis     Dr. Logan sanders GERD (gastroesophageal reflux disease)     j H/O Laparoscopic Cholecystectomy In 09/2018     Dr. Yuval sanders Irritable Bowel Syndrome     Dr. Logan Tsai Noted This During Patient's 6/29/16 TC: "Mild Colonic Spasm C/W IBS"    j Small Internal Hemorrhoids     Dr. Logan Tsai    k H/O Right Inguinal Hernia Repair In 2006     k Low Testosterone Levels ####    11/21/18 Decreased Testosterone To 100 Mg IM Every 2 Weeks; Dr. Valentín Augustin; On IM Testosterone Monthly; 10/7/16 " Testosterone = 168 (195.0-1138.0)    l Bilateral Hand Arthritis     11/24/23 Referred To Dr. Israel Dominguez; 11/24/23 Rheumatologic Labs = All Normal    l Chronic Left Shoulder Pain     7/13/17 Referred To Dr. Bethel Ornelas    l H/O Right Shoulder SX In 2011     Dr. Barragan [facility]    n Attention Deficit Disorder     11/24/23 His Is Now Doing Well On No Treatment For This; 7/22/22 RXd Vyvanse 30 Mg qAM With Toprol-XL 12.5-25 Mg Daily; 10/23/18 RXd Concerta 18 Mg qAM; 10/19/17 D/Cd Vyvanse And RXd Guanfacine 1 Mg Daily (#90;Rx3); Wellbutrin- Mg Was Ineffective; Adderal-XR 20 Mg Increased His BP And HR; Vyvanse 50 Mg qAM Caused Increased BP; This DX Is Well [facility]    n Depression     This Resolved On Concerta Alone; 10/23/18 RXd Concerta 18 Mg qAM, And Will See If This Is Benifitted By It    n Therapeutic Drug Monitoring     o Left Ear Basal Cell Carcinoma     Dr. Mcelroy (In [place]) Resected This In 09/2022 And Also Started Efudex Cream Topically    o Recurrent Sinus Infections 07/26/2024    Dr. Cristian Sinclair (ENT); 7/17/24 Maxillofacial Sinus CT Scan W/O Contrast = (Report Scanned)    Wellness Visit 11/24/2023      Past Surgical History:   Procedure Laterality Date    COLONOSCOPY N/A 6/29/2016    Procedure: COLONOSCOPY;  Surgeon: Logan Tsai Jr., MD;  Location: [facility];  Service: Endoscopy;  Laterality: N/A;    FUNCTIONAL ENDOSCOPIC SINUS SURGERY (FESS) USING COMPUTER-ASSISTED NAVIGATION Bilateral 12/19/2024    Procedure: FESS, USING COMPUTER-ASSISTED NAVIGATION- [employer];  Surgeon: Regulo Barker MD;  Location: [address];  Service: ENT;  Laterality: Bilateral;    HERNIA REPAIR Right 2006    groin    LAPAROSCOPIC CHOLECYSTECTOMY N/A 9/12/2018    Procedure: CHOLECYSTECTOMY, LAPAROSCOPIC;  Surgeon: Yuval Cadena MD;  Location: [facility] OR;  Service: General;  Laterality: N/A;    SHOULDER SURGERY  2011    Right    TONSILLECTOMY       Family History   Problem Relation  Name Age of Onset    Sleep apnea Brother      Heart disease Father  47    Hypertension Father      Cancer Mother          BREAST    Cataracts Paternal Grandmother      Glaucoma Paternal Grandmother      Glaucoma Paternal Grandfather      Amblyopia Neg Hx      Blindness Neg Hx      Diabetes Neg Hx      Macular degeneration Neg Hx      Retinal detachment Neg Hx      Strabismus Neg Hx      Stroke Neg Hx      Thyroid disease Neg Hx       Social History[1]    Objective:     Physical Exam  Constitutional:       General: He is not in acute distress.     Appearance: Normal appearance. He is well-developed. He is not ill-appearing or diaphoretic.   HENT:      Head: Normocephalic and atraumatic.      Right Ear: External ear normal.      Left Ear: External ear normal.      Nose: Nose normal.   Eyes:      General: No scleral icterus.        Right eye: No discharge.         Left eye: No discharge.      Extraocular Movements: Extraocular movements intact.      Conjunctiva/sclera: Conjunctivae normal.   Pulmonary:      Effort: Pulmonary effort is normal. No respiratory distress.      Breath sounds: No stridor.   Skin:     General: Skin is dry.      Coloration: Skin is not jaundiced or pale.      Findings: No erythema.   Neurological:      General: No focal deficit present.      Mental Status: He is alert and oriented to person, place, and time. Mental status is at baseline.   Psychiatric:         Mood and Affect: Mood normal.         Behavior: Behavior normal.         Thought Content: Thought content normal.         Judgment: Judgment normal.       Data:    All data, including recent labs, radiology, and pathology, has been independently reviewed.    Labs:    Recent Labs   Lab Result Units 01/08/25  1615 01/15/25  1610 01/24/25  1531   WBC K/uL 6.95 7.09 6.59   Hemoglobin g/dL 12.5* 12.7* 13.4*   Hematocrit % 38.7* 37.7* 39.6*   Sodium mmol/L 135* 137 135*   Potassium mmol/L 3.3* 3.7 3.9   Chloride mmol/L 99 101 99   BUN mg/dL 17  12 15   Creatinine mg/dL 1.1 0.8 0.8   AST U/L 29 35 42*   ALT U/L 30 38 51*   Alkaline Phosphatase U/L 98 95 102   Total Bilirubin mg/dL 0.3 0.4 0.3   CRP mg/L 7.2 5.2 5.7      Radiology:    No results found in the last 24 hours.     Assessment:    1. Sinusitis, unspecified chronicity, unspecified location  No evidence of ongoing or worsening infection at this time  Has follow up scheduled w/ ENT next week  Will follow up w/ them after visit to ensure no concerns for ongoing infection            Follow up as needed    The total time for evaluation and management services performed on 3/6/25 was greater than 20 minutes.     Karen Ferrario DO  Infectious Disease         [1]   Social History  Tobacco Use    Smoking status: Never     Passive exposure: Never    Smokeless tobacco: Never   Substance Use Topics    Alcohol use: Yes     Alcohol/week: 0.8 standard drinks of alcohol     Types: 1 Standard drinks or equivalent per week     Comment: 1-2 / week    Drug use: No

## 2025-03-07 PROBLEM — R00.0 SINUS TACHYCARDIA: Status: RESOLVED | Noted: 2023-11-24 | Resolved: 2025-03-07

## 2025-03-14 ENCOUNTER — OFFICE VISIT (OUTPATIENT)
Dept: OTOLARYNGOLOGY | Facility: CLINIC | Age: 60
End: 2025-03-14
Payer: COMMERCIAL

## 2025-03-14 VITALS
HEART RATE: 98 BPM | DIASTOLIC BLOOD PRESSURE: 78 MMHG | BODY MASS INDEX: 34.69 KG/M2 | WEIGHT: 214.94 LBS | SYSTOLIC BLOOD PRESSURE: 138 MMHG

## 2025-03-14 DIAGNOSIS — Z98.890 STATUS POST FUNCTIONAL ENDOSCOPIC SINUS SURGERY (FESS): ICD-10-CM

## 2025-03-14 DIAGNOSIS — M86.8X8 POTT'S PUFFY TUMOR (FRONTAL BONE OSTEOMYELITIS WITH SUBPERIOSTEAL ABSCESS): ICD-10-CM

## 2025-03-14 DIAGNOSIS — J32.1 CHRONIC FRONTAL SINUSITIS: Primary | ICD-10-CM

## 2025-03-14 PROCEDURE — 99999 PR PBB SHADOW E&M-EST. PATIENT-LVL IV: CPT | Mod: PBBFAC,,, | Performed by: STUDENT IN AN ORGANIZED HEALTH CARE EDUCATION/TRAINING PROGRAM

## 2025-03-14 NOTE — PROGRESS NOTES
Subjective:      Lucien is a 59 y.o. male who comes for follow-up of chronic frontal sinusitis. He is s/p ESS with FDO on 12/27/24. PICC line removed 1/31/25. Continues to do well.  No facial swelling or frontal sinus pressure.  Does have some tingling sensation over his left eye that is episodic in nature.  Does endorse some postnasal drainage, no purulent nasal drainage.    His current sinus regime consists of: Saline irrigations.     The assessment of quality and severity of symptoms as measured by the SNOT-22: deferred.     The patient's medications, allergies, past medical, surgical, social and family histories were reviewed and updated as appropriate.    Review of Systems   Constitutional:  Positive for malaise/fatigue.   Eyes: Negative.    Cardiovascular: Negative.    Gastrointestinal: Negative.    Genitourinary: Negative.    Musculoskeletal: Negative.    Skin: Negative.    Neurological: Negative.    Psychiatric/Behavioral: Negative.       A detailed review of systems was obtained with pertinent positives as per the above HPI, and otherwise negative.      Objective:     /78 (BP Location: Left arm, Patient Position: Sitting)   Pulse 98   Wt 97.5 kg (214 lb 15.2 oz)   BMI 34.69 kg/m²        Constitutional:   Vital signs are normal. He appears well-developed and well-nourished.     Head:  Normocephalic and atraumatic.         Ears:  Hearing normal to normal and whispered voice; external ear normal without scars, lesions, or masses; ear canal, tympanic membrane, and middle ear normal..   Right Ear: No swelling. Tympanic membrane is not perforated and not bulging. No middle ear effusion.   Left Ear: No swelling. Tympanic membrane is not perforated and not bulging.  No middle ear effusion.     Nose:  Nose normal including turbinates, nasal mucosa, sinuses and nasal septum. No epistaxis.     Mouth/Throat  Oropharynx clear and moist without lesions or asymmetry and normal uvula midline. Normal  dentition. No tonsillar abscesses. Tonsillar exudate.      Neck:  Neck normal without thyromegaly masses, asymmetry, normal tracheal structure, crepitus, and tenderness, thyroid normal, trachea normal, phonation normal, full range of motion with neck supple and no adenopathy. No stridor present.        Head (right side): No submental adenopathy present.        Head (left side): No submental adenopathy present.     He has no cervical adenopathy.     Pulmonary/Chest:   No stridor.       Procedure    Nasal Endoscopy:  3/14/2025    The use of diagnostic nasal endoscopy was considered medically necessary for the evaluation and visualization of the nasal anatomy for symptoms suggestive of nasal or sinus origin. Physical examination (including a nasal speculum evaluation) did not provide sufficient clinical information to establish a diagnosis, or symptoms did not improve or worsened following treatment.     The nasal cavity was decongested with topical 1% phenylephrine and anesthetized with 4% lidocaine.  A rigid 0-degree endoscope was introduced into the nasal cavity.    The patient was seated in the examination chair. After discussion of risks and benefits, a nasal endoscope was inserted into the nose the endoscope was passed along the left nasal floor to the nasopharynx. It was then passed between the middle and superior meatus, nasal turbinates, nasal septum, nasopharynx and sphenoethmoid region. The nasal endoscope was withdrawn and there was no complications. An identical procedure was performed on the right side. I was present for the entire procedure.The patient tolerated the above procedure well. The findings of this procedure can be found in the dictated note from 3/14/2025 visit.                                        Data Reviewed    WBC (K/uL)   Date Value   03/08/2025 8.07     Eosinophil % (%)   Date Value   03/08/2025 2.6     Eos # (K/uL)   Date Value   03/08/2025 0.2     Platelets (K/uL)   Date Value    03/08/2025 328     Glucose (mg/dL)   Date Value   03/08/2025 96     Total IgE (IU/mL)   Date Value   11/25/2024 60       No sinus imaging available.      Assessment:     1. Chronic frontal sinusitis    2. 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 Status post functional endoscopic sinus surgery (FESS)      Plan:     - RTC 6 months  - Will order CT at next visit.  - Continue irrigations.     Regulo Barker MD

## 2025-07-30 ENCOUNTER — CLINICAL SUPPORT (OUTPATIENT)
Dept: AUDIOLOGY | Facility: CLINIC | Age: 60
End: 2025-07-30
Payer: COMMERCIAL

## 2025-07-30 ENCOUNTER — OFFICE VISIT (OUTPATIENT)
Dept: OTOLARYNGOLOGY | Facility: CLINIC | Age: 60
End: 2025-07-30
Payer: COMMERCIAL

## 2025-07-30 DIAGNOSIS — H90.3 SENSORINEURAL HEARING LOSS (SNHL), BILATERAL: Primary | ICD-10-CM

## 2025-07-30 DIAGNOSIS — H93.13 TINNITUS OF BOTH EARS: ICD-10-CM

## 2025-07-30 DIAGNOSIS — H61.23 EXCESSIVE CERUMEN IN BOTH EAR CANALS: ICD-10-CM

## 2025-07-30 DIAGNOSIS — H90.3 SENSORINEURAL HEARING LOSS (SNHL) OF BOTH EARS: Primary | ICD-10-CM

## 2025-07-30 PROCEDURE — 92567 TYMPANOMETRY: CPT | Mod: S$GLB,,,

## 2025-07-30 PROCEDURE — 99213 OFFICE O/P EST LOW 20 MIN: CPT | Mod: S$GLB,,,

## 2025-07-30 PROCEDURE — 4010F ACE/ARB THERAPY RXD/TAKEN: CPT | Mod: CPTII,S$GLB,,

## 2025-07-30 PROCEDURE — 1159F MED LIST DOCD IN RCRD: CPT | Mod: CPTII,S$GLB,,

## 2025-07-30 PROCEDURE — 92557 COMPREHENSIVE HEARING TEST: CPT | Mod: S$GLB,,,

## 2025-07-30 PROCEDURE — 99999 PR PBB SHADOW E&M-EST. PATIENT-LVL I: CPT | Mod: PBBFAC,,,

## 2025-07-30 PROCEDURE — 3044F HG A1C LEVEL LT 7.0%: CPT | Mod: CPTII,S$GLB,,

## 2025-07-30 PROCEDURE — 99999 PR PBB SHADOW E&M-EST. PATIENT-LVL III: CPT | Mod: PBBFAC,,,

## 2025-07-30 NOTE — PROGRESS NOTES
Lucien Linares, a 59 y.o. male, was seen today in the clinic for an audiologic evaluation. Mr. Linares reported gradual onset of hearing difficulty, bilaterally, especially in noisy environments. He reported intermittent ringing tinnitus, bilaterally. Patient denied otalgia, aural fullness, and dizziness.    Tympanometry revealed Type A tympanogram in the right ear and Type A tympanogram in the left ear. Audiogram results revealed hearing sensitivity within normal limits sloping to moderately-severe sensorineural hearing loss in the right ear and hearing sensitivity within normal limits sloping to a moderately-severe sensorineural hearing loss in the left ear.  Speech reception thresholds were noted at 20 dBHL in the right ear and 20 dBHL in the left ear.  Speech discrimination scores were 96% in the right ear and 96% in the left ear.    Recommendations:  Otologic evaluation  Hearing aid consultation  Annual audiogram or sooner if change is perceived  Hearing protection in noise

## 2025-07-30 NOTE — PROGRESS NOTES
Subjective:   Lucien Linares is a 59 y.o. male with PMHx of HTN, sinus tachycardia, hx of left ear BCC and s/p FESS 12/2024 by Dr. Barker for chronic frontal sinusitis with Pott's puffy tumor who presents today for hearing loss. He reports a progressive hearing loss in both ears. He struggles with clarity of words especially in background noise. He denies otalgia, otorrhea or vertigo. He does note intermittent, chronic, non bothersome tinnitus in both ears. He denies a history of ear infections or ear surgeries. He endorses some excessive noise exposure over his lifetime - loud music, occupational with hearing protecting. He has not had a hearing test in 25+ years.       Past Medical History  He has a past medical history of a Chronic Sinus Tachycardia, a Family H/O Early CAD, b Hypertension, c Hypercholesterolemia, d Hyperglycemia, j Chronic Mildly Elevated ALT Levels, j Descending And Sigmoid Diverticulosis, j GERD (gastroesophageal reflux disease), j H/O Laparoscopic Cholecystectomy In 09/2018, j Irritable Bowel Syndrome, j Small Internal Hemorrhoids, k H/O Right Inguinal Hernia Repair In 2006, k Low Testosterone Levels, l Bilateral Hand Arthritis, l Chronic Left Shoulder Pain, l H/O Right Shoulder SX In 2011, n Attention Deficit Disorder, n Concentration Impairment, n Depression, n Therapeutic Drug Monitoring, o H/O Chronic Frontal Bone Osteomyelitis With Pott's Puffy Tumor S/P Surgeries X 2 In 2024, o Left Ear Basal Cell Carcinoma, o Recurrent Sinus Infections, and Wellness Visit 3/7/2025.    Past Surgical History  He has a past surgical history that includes Shoulder surgery (2011); Hernia repair (Right, 2006); Tonsillectomy; Colonoscopy (N/A, 6/29/2016); Laparoscopic cholecystectomy (N/A, 9/12/2018); and Functional endoscopic sinus surgery (FESS) using computer-assisted navigation (Bilateral, 12/19/2024).    Family History  His family history includes Cancer in his mother; Cataracts in his paternal  grandmother; Glaucoma in his paternal grandfather and paternal grandmother; Heart disease (age of onset: 47) in his father; Hypertension in his father; Sleep apnea in his brother.    Social History  He reports that he has never smoked. He has never been exposed to tobacco smoke. He has never used smokeless tobacco. He reports current alcohol use of about 0.8 standard drinks of alcohol per week. He reports that he does not use drugs.    Allergies  He is allergic to codeine, penicillins, adderall [dextroamphetamine-amphetamine], and vyvanse [lisdexamfetamine].    Medications  He has a current medication list which includes the following prescription(s): acetaminophen, aspirin, budesonide, ergocalciferol (vitamin d2), fluticasone propionate, hydrocodone-acetaminophen, lisdexamfetamine, meloxicam, metoprolol succinate, montelukast, multivitamin, neilmed sinus rinse refill, ondansetron, prednisone, rosuvastatin, telmisartan-hydrochlorothiazide, and testosterone cypionate.    Review of Systems   HENT: Positive for hearing loss and ringing in the ears.  Negative for ear discharge, ear infection and ear pain.      Neurological: Negative for dizziness.          Objective:       Ears:    Right Ear: No drainage, swelling or tenderness. Tympanic membrane is not injected, not scarred, not perforated, not erythematous, not retracted and not bulging. No middle ear effusion.   Left Ear: No drainage, swelling or tenderness. Tympanic membrane is scarred. Tympanic membrane is not injected, not perforated, not erythematous, not retracted and not bulging.  No middle ear effusion.   Ceruminous debris adhered to ear canal wall bilaterally removed via microscopy      Procedure  Cerumen removal performed.  See procedure note.  Procedure Note:  The patient was brought to the minor procedure room and placed under the operating microscope of the right and left ear canal which was cleaned of ceruminous debris. Using a combination of suction,  "curettes and cup forceps the patient's cerumen was removed. The patient tolerated the procedure well. There were no complications.     Audiology     I independently reviewed the tracings of the complete audiometric evaluation performed today. I reviewed the audiogram with the patient as well. Pertinent findings include: normal sloping to moderately-severe sensorineural hearing loss in the right ear and normal sloping to a moderately-severe sensorineural hearing loss in the left ear. SRT 20 dBHL in the right ear and 20 dBHL in the left ear.  Speech discrimination scores were 96% in the right ear and 96% in the left ear. Type A tympanogram AU.       Assessment:     1. Sensorineural hearing loss (SNHL), bilateral    2. Tinnitus of both ears    3. Excessive cerumen in both ear canals      Plan:   Lucien Unger" was seen today for hearing loss.  Diagnoses and all orders for this visit:    Sensorineural hearing loss (SNHL), bilateral  Audiogram discussed in detail with the patient which revealed normal sloping to moderately severe SNHL in both ears. He is medically cleared for hearing amplification and was instructed to reach out to his insurance to determine hearing aid benefits. Annual audiograms recommended unless changes perceived.     Tinnitus of both ears  Discussed the etiology of tinnitus and management strategies including the use of background sound enrichment. Further instructed that avoidance of excess caffeine, alcohol, tobacco, sodium and stress can be of benefit.      Excessive cerumen in both ear canals  Cerumen impaction removed under microscopy. Patient tolerated procedure well. RTC PRN.       "

## (undated) DEVICE — SPLINT REUTER BIVALVE 0.5MM

## (undated) DEVICE — DRAPE EENT SPLIT STERILE

## (undated) DEVICE — Device

## (undated) DEVICE — KIT SINUS OMC

## (undated) DEVICE — NDL INSUF ULTRA VERESS 120MM

## (undated) DEVICE — CONTAINER SPECIMEN OR STER 4OZ

## (undated) DEVICE — COVER MAYO STND XL 30X57IN

## (undated) DEVICE — TOWEL OR DISP STRL BLUE 4/PK

## (undated) DEVICE — BLADE SURG CARBON STEEL SZ11

## (undated) DEVICE — KIT ANTIFOG

## (undated) DEVICE — ADAPTER PURASTAT SYRINGE

## (undated) DEVICE — SEE MEDLINE ITEM 157128

## (undated) DEVICE — SEE L#120831

## (undated) DEVICE — CATH IV INTROCAN 14G X 2.

## (undated) DEVICE — SYR 10CC LUER LOCK

## (undated) DEVICE — SYR PURAGEL 3ML

## (undated) DEVICE — SPONGE PATTY SURGICAL .5X3IN

## (undated) DEVICE — TUBING PNEUMO

## (undated) DEVICE — NEPTUNE 4 PORT MANIFOLD

## (undated) DEVICE — SOL IRR STRL WATER 500ML

## (undated) DEVICE — TROCAR ENDOPATH XCEL 5MM 7.5CM

## (undated) DEVICE — TROCAR ENDOPATH XCEL 5X75MM

## (undated) DEVICE — DRAPE ABDOMINAL TIBURON 14X11

## (undated) DEVICE — SCISSOR 5MMX35CM DIRECT DRIVE

## (undated) DEVICE — SUT ETHILON 4-0 PS2 18 BLK

## (undated) DEVICE — GLOVE SURG BIOGEL LATEX SZ 7.5

## (undated) DEVICE — BAG TISS RETRV MONARCH 10MM

## (undated) DEVICE — APPLICATOR CHLORAPREP ORN 26ML

## (undated) DEVICE — TROCAR ENDOPATH XCEL 11MM 10CM

## (undated) DEVICE — GOWN NONREINF SET-IN SLV 2XL

## (undated) DEVICE — TRACKER ENT INSTRUMENT

## (undated) DEVICE — SYR 50CC LL

## (undated) DEVICE — SEE MEDLINE ITEM 152622

## (undated) DEVICE — SUT MONOCYRL 4-0 PS2 UND

## (undated) DEVICE — TRACKER PATIENT NON INVASIVE

## (undated) DEVICE — ELECTRODE REM PLYHSV RETURN 9

## (undated) DEVICE — BLADE QUADCUT STRAIGHT 4.3MM

## (undated) DEVICE — SUT 4-0 CHROMIC GUT / SH

## (undated) DEVICE — SUT 0 VICRYL / UR6 (J603)

## (undated) DEVICE — TRAY ENT 4/CS

## (undated) DEVICE — CLIP AUTO APPLIER ENDO